# Patient Record
Sex: FEMALE | Race: WHITE | NOT HISPANIC OR LATINO | Employment: OTHER | URBAN - METROPOLITAN AREA
[De-identification: names, ages, dates, MRNs, and addresses within clinical notes are randomized per-mention and may not be internally consistent; named-entity substitution may affect disease eponyms.]

---

## 2020-10-06 ENCOUNTER — INITIAL CONSULT (OUTPATIENT)
Dept: CARDIOLOGY | Facility: CLINIC | Age: 80
End: 2020-10-06
Payer: MEDICARE

## 2020-10-06 VITALS
DIASTOLIC BLOOD PRESSURE: 77 MMHG | SYSTOLIC BLOOD PRESSURE: 131 MMHG | WEIGHT: 194.25 LBS | HEART RATE: 87 BPM | BODY MASS INDEX: 32.36 KG/M2 | HEIGHT: 65 IN | OXYGEN SATURATION: 95 %

## 2020-10-06 DIAGNOSIS — I65.23 BILATERAL CAROTID ARTERY STENOSIS: Chronic | ICD-10-CM

## 2020-10-06 DIAGNOSIS — I10 ESSENTIAL HYPERTENSION: Chronic | ICD-10-CM

## 2020-10-06 DIAGNOSIS — I34.0 SEVERE MITRAL REGURGITATION: Chronic | ICD-10-CM

## 2020-10-06 PROCEDURE — 99999 PR PBB SHADOW E&M-NEW PATIENT-LVL IV: ICD-10-PCS | Mod: PBBFAC,,, | Performed by: INTERNAL MEDICINE

## 2020-10-06 PROCEDURE — 99204 OFFICE O/P NEW MOD 45 MIN: CPT | Mod: PBBFAC | Performed by: INTERNAL MEDICINE

## 2020-10-06 PROCEDURE — 99205 OFFICE O/P NEW HI 60 MIN: CPT | Mod: S$PBB,,, | Performed by: INTERNAL MEDICINE

## 2020-10-06 PROCEDURE — 99205 PR OFFICE/OUTPT VISIT, NEW, LEVL V, 60-74 MIN: ICD-10-PCS | Mod: S$PBB,,, | Performed by: INTERNAL MEDICINE

## 2020-10-06 PROCEDURE — 99999 PR PBB SHADOW E&M-NEW PATIENT-LVL IV: CPT | Mod: PBBFAC,,, | Performed by: INTERNAL MEDICINE

## 2020-10-06 RX ORDER — ASPIRIN 81 MG/1
81 TABLET ORAL DAILY
COMMUNITY

## 2020-10-06 RX ORDER — HYDRALAZINE HYDROCHLORIDE 25 MG/1
25 TABLET, FILM COATED ORAL DAILY
COMMUNITY
End: 2020-10-06

## 2020-10-06 RX ORDER — LOSARTAN POTASSIUM 25 MG/1
25 TABLET ORAL DAILY
COMMUNITY

## 2020-10-06 RX ORDER — CYCLOBENZAPRINE HCL 10 MG
10 TABLET ORAL 3 TIMES DAILY PRN
COMMUNITY

## 2020-10-06 RX ORDER — OMEPRAZOLE 20 MG/1
20 CAPSULE, DELAYED RELEASE ORAL DAILY
COMMUNITY

## 2020-10-06 RX ORDER — CARVEDILOL 6.25 MG/1
6.25 TABLET ORAL
COMMUNITY

## 2020-10-06 RX ORDER — HYDROCHLOROTHIAZIDE 25 MG/1
12.5 TABLET ORAL DAILY
COMMUNITY

## 2020-10-06 RX ORDER — AMLODIPINE BESYLATE 5 MG/1
5 TABLET ORAL DAILY
COMMUNITY

## 2020-10-06 NOTE — PROGRESS NOTES
Patient MRN: [id number]  Patient : 1940  PCP: Primary Doctor No  Ref: Dr. Joaquin Conti    CC:   Chief Complaint   Patient presents with    Mitral Regurgitation     SOB        History of Present Illness:   Jeanmerry Peabody is a 80 y.o. y.o. female who presents for consultation for mitral regurgitation.     This is a new patient for me presenting with an established problem of mitral regurgitation. Her other chronic medical conditions include HTN, mild asymptomatic carotid artery disease.     Ms. Peabody is followed by Joaquin Conti with [facility].  She notes approximately starting in  she had acute onset dyspnea that was worse with ambulation.  She notes is getting progressively worse.  She lives currently in a motor home with her dog and his noticed that she has increasing short of breath that is now worse with less than or equal to 1 block of ambulation. This improved with rest.  She denies chest pain, lower extremity edema, any hospitalizations.  For workup for her dyspnea she had an echocardiogram that revealed severe MR. She underwent cardiac catheterization and 2020 that showed no evidence of coronary artery disease. She underwent MAGNO though significant for severe MR with flail posterior P2 segment.  She is referred [facility] for evaluation for MitraClip.    This is the extent of the patient's complaints at this time. Patient queried and denies any further complaint.     TTE 2020  Left ventricular function is normal.  Ejection fraction 60%  Left atrial diameter severely increased.  Right atrium is moderately enlarged.  Mild mitral annular calcification.  Moderate tricuspid regurgitation  Pulmonary artery systolic pressure is 51 mm Hg    MAGNO 2020  Normal LV systolic function.  Normal RV systolic function.  Left atrium appears dilated  Aortic valve is trileaflet and appears normal without evidence of stenosis.  Mitral valve posterior leaflet appears flail with severe  regurgitation noted.  Tricuspid valve appears normal no evidence stenosis.  There is trace tricuspid regurgitation.  Grade 2 atherosclerotic disease in thoracic aorta.  No intracardiac mass/thrombus in the left atrial appendage.        Past Medical History:     Past Medical History:   Diagnosis Date    Bilateral carotid artery stenosis 10/6/2020    Essential hypertension 10/6/2020    Severe mitral regurgitation 10/6/2020       Past Surgical History:     Past Surgical History:   Procedure Laterality Date    ANGIOGRAM, CORONARY, WITH LEFT HEART CATHETERIZATION  09/02/2020    APPENDECTOMY      TONSILLECTOMY         Social History:     Social History     Tobacco Use    Smoking status: Never Smoker    Smokeless tobacco: Never Used   Substance Use Topics    Alcohol use: Never     Frequency: Never       Family History:     Family History   Problem Relation Age of Onset    COPD Mother     Brain cancer Brother        Allergies:   Review of patient's allergies indicates:  No Known Allergies    Review of Systems:   Review of Systems   Constitutional: Negative for chills, fever and weight loss.   HENT: Negative for hearing loss and sore throat.    Eyes: Negative for blurred vision.   Respiratory: Positive for shortness of breath. Negative for cough.    Cardiovascular: Positive for orthopnea. Negative for chest pain, palpitations, leg swelling and PND.   Gastrointestinal: Negative for abdominal pain, constipation, diarrhea, heartburn, nausea and vomiting.   Genitourinary: Negative for dysuria.   Musculoskeletal: Negative for neck pain.   Skin: Negative for rash.   Neurological: Negative for dizziness and headaches.   Endo/Heme/Allergies: Does not bruise/bleed easily.   Psychiatric/Behavioral: Negative for substance abuse.       All other systems reviewed and are negative.   Medications:     Current Outpatient Medications on File Prior to Visit   Medication Sig Dispense Refill    amLODIPine (NORVASC) 5 MG tablet Take  "5 mg by mouth once daily.      aspirin (ECOTRIN) 81 MG EC tablet Take 81 mg by mouth once daily.      carvediloL (COREG) 6.25 MG tablet Take 6.25 mg by mouth.      cyclobenzaprine (FLEXERIL) 10 MG tablet Take 10 mg by mouth 3 (three) times daily as needed for Muscle spasms.      hydroCHLOROthiazide (HYDRODIURIL) 25 MG tablet Take 12.5 mg by mouth once daily.       losartan (COZAAR) 25 MG tablet Take 25 mg by mouth once daily.      omeprazole (PRILOSEC) 20 MG capsule Take 20 mg by mouth once daily.      [DISCONTINUED] hydrALAZINE (APRESOLINE) 25 MG tablet Take 25 mg by mouth once daily.       No current facility-administered medications on file prior to visit.        Physical Exam:   /77 (BP Location: Right arm, Patient Position: Sitting, BP Method: Large (Automatic))   Pulse 87   Ht 5' 5" (1.651 m)   Wt 88.1 kg (194 lb 3.6 oz)   SpO2 95%   BMI 32.32 kg/m²   Physical Exam   Constitutional: She is oriented to person, place, and time and well-developed, well-nourished, and in no distress.   HENT:   Head: Normocephalic and atraumatic.   Eyes: Conjunctivae are normal.   Neck: Normal range of motion. Neck supple. No JVD present.   Cardiovascular: Normal rate and regular rhythm. Exam reveals no gallop and no friction rub.   Murmur heard.   Systolic murmur is present with a grade of 3/6.  Pulmonary/Chest: Effort normal and breath sounds normal. No respiratory distress. She has no wheezes. She has no rales. She exhibits no tenderness.   Abdominal: Soft. Bowel sounds are normal. She exhibits no distension and no mass. There is no abdominal tenderness. There is no rebound and no guarding.   Musculoskeletal: Normal range of motion.         General: No edema.   Neurological: She is alert and oriented to person, place, and time.   Skin: Skin is warm and dry.   Psychiatric: Mood normal.   Vitals reviewed.      Labs:   No results found for: WBC, HGB, HCT, PLT, GRAN     Chemistry    No results found for: NA, K, CL, " CO2, BUN, CREATININE, GLU No results found for: CALCIUM, ALKPHOS, AST, ALT, BILITOT, ESTGFRAFRICA, EGFRNONAA       No results found for: ALT, AST, GGT, ALKPHOS, BILITOT   No results found for: HGBA1C  No results found for: BNP, BNPTRIAGEBLO  No results found for: CHOL, HDL, LDLCALC, TRIG  No results found for: INR, PROTIME     I have reviewed all pertinent labs within the past 24 hours.    Cardiovascular Imaging:   Echo: No results found for: EF  2D echo with color flow doppler: No results found for this or any previous visit. and Transthoracic echo (TTE) complete (Cupid Only): No results found for this or any previous visit.      Test(s) Reviewed  I have reviewed the following in detail:  [] Stress test   [x] Angiography   [x] Echocardiogram   [] Labs   [] Other:       Assessment & Plan:   Severe mitral regurgitation  - Degenerative mitral regurgitation posterior leaflet flail P2 segment  - dyspnea with ambulation less than 1 block of ambulation  - LHC- negative for ischemia   - Reviewed outside MAGNO images.   - continue current therapy with Norvasc 5, carvedilol 6.25, losartan 25, HCTZ 12.5  - NYHA class 3     - FEV1/DLCO-pending  -STS- 3.6%  -KCCQ/6 min walk test-pending  -TTE pending  MV  EOA  MV area  MV gradient  Insufficiency degree     CTS evaluation-pending    Essential hypertension  - continue current therapy with Norvasc 5, carvedilol 6.25, losartan 25, HCTZ 12.5    Bilateral carotid artery stenosis  Continue aspirin 81 mg.  Currently not on a statin  Asymptomatic without history of TIA/CVA       Marbin Reardon - Pager# [phone]  10/6/2020  5:16 PM  Cardiovascular Fellow  [facility]         Interventional Cardiology Staff  I have personally taken the history and examined this patient. I have discussed and agree with the resident's findings and plan as documented in the resident's note.  Patient referred by Dr. Conti to evaluate for mitral regurgitation and mitral clip.  Transesophageal  echo demonstrates that she has a flail P2 leaflet and is a good candidate for mitral clip.  She still needs to complete screening details including surgical consultation, walk test, pulmonary function and Brookesmith questionnaire.    Alexandr Everett

## 2020-10-06 NOTE — LETTER
October 7, 2020      Joaquin Conti MD  [address]  Cardiovascular [place] [facility] [place] [address]           [facility] 3rd Fl  [address]  [facility] [address]  Phone: [phone]          Patient: Jeanmerry Peabody   MR Number: [id number]   YOB: 1940   Date of Visit: 10/6/2020       Dear Dr. Joaquin Conti:    Thank you for referring Jeanmerry Peabody to me for evaluation. Attached you will find relevant portions of my assessment and plan of care.    If you have questions, please do not hesitate to call me. I look forward to following Jeanmerry Peabody along with you.    Sincerely,    Alexandr Everett MD    Enclosure  CC:  No Recipients    If you would like to receive this communication electronically, please contact [email or web address] or [phone] to request more information on [employer] access.    For providers and/or their staff who would like to refer a patient to [facility], please contact us through our one-stop-shop provider referral line, [facility], at [phone].    If you feel you have received this communication in error or would no longer like to receive these types of communications, please e-mail [email or web address]

## 2020-10-08 DIAGNOSIS — Z01.812 PRE-PROCEDURE LAB EXAM: Primary | ICD-10-CM

## 2020-10-08 DIAGNOSIS — I34.0 SEVERE MITRAL REGURGITATION: Primary | ICD-10-CM

## 2020-10-13 ENCOUNTER — LAB VISIT (OUTPATIENT)
Dept: FAMILY MEDICINE | Facility: CLINIC | Age: 80
End: 2020-10-13
Payer: MEDICARE

## 2020-10-13 DIAGNOSIS — Z01.812 PRE-PROCEDURE LAB EXAM: ICD-10-CM

## 2020-10-13 PROCEDURE — U0003 INFECTIOUS AGENT DETECTION BY NUCLEIC ACID (DNA OR RNA); SEVERE ACUTE RESPIRATORY SYNDROME CORONAVIRUS 2 (SARS-COV-2) (CORONAVIRUS DISEASE [COVID-19]), AMPLIFIED PROBE TECHNIQUE, MAKING USE OF HIGH THROUGHPUT TECHNOLOGIES AS DESCRIBED BY CMS-2020-01-R: HCPCS

## 2020-10-14 LAB — SARS-COV-2 RNA RESP QL NAA+PROBE: NOT DETECTED

## 2020-10-15 ENCOUNTER — HOSPITAL ENCOUNTER (OUTPATIENT)
Dept: PULMONOLOGY | Facility: CLINIC | Age: 80
Discharge: HOME | End: 2020-10-15
Payer: MEDICARE

## 2020-10-15 ENCOUNTER — OFFICE VISIT (OUTPATIENT)
Dept: CARDIOTHORACIC SURGERY | Facility: CLINIC | Age: 80
End: 2020-10-15
Payer: MEDICARE

## 2020-10-15 ENCOUNTER — HOSPITAL ENCOUNTER (OUTPATIENT)
Dept: CARDIOLOGY | Facility: HOSPITAL | Age: 80
Discharge: HOME | End: 2020-10-15
Attending: INTERNAL MEDICINE
Payer: MEDICARE

## 2020-10-15 VITALS
HEART RATE: 83 BPM | HEIGHT: 65 IN | OXYGEN SATURATION: 98 % | BODY MASS INDEX: 32.4 KG/M2 | WEIGHT: 194.44 LBS | BODY MASS INDEX: 32.4 KG/M2 | WEIGHT: 194.44 LBS | HEIGHT: 65 IN | DIASTOLIC BLOOD PRESSURE: 79 MMHG | SYSTOLIC BLOOD PRESSURE: 145 MMHG

## 2020-10-15 VITALS
SYSTOLIC BLOOD PRESSURE: 138 MMHG | DIASTOLIC BLOOD PRESSURE: 70 MMHG | BODY MASS INDEX: 32.32 KG/M2 | WEIGHT: 194 LBS | HEIGHT: 65 IN | HEART RATE: 80 BPM

## 2020-10-15 DIAGNOSIS — I34.0 SEVERE MITRAL REGURGITATION: Chronic | ICD-10-CM

## 2020-10-15 DIAGNOSIS — I34.0 SEVERE MITRAL REGURGITATION: ICD-10-CM

## 2020-10-15 LAB
ASCENDING AORTA: 3.9 CM
AV INDEX (PROSTH): 0.68
AV MEAN GRADIENT: 4 MMHG
AV PEAK GRADIENT: 10 MMHG
AV VALVE AREA: 2.91 CM2
AV VELOCITY RATIO: 0.58
BSA FOR ECHO PROCEDURE: 2.01 M2
CV ECHO LV RWT: 0.32 CM
DOP CALC AO PEAK VEL: 1.61 M/S
DOP CALC AO VTI: 25.33 CM
DOP CALC LVOT AREA: 4.3 CM2
DOP CALC LVOT DIAMETER: 2.33 CM
DOP CALC LVOT PEAK VEL: 0.94 M/S
DOP CALC LVOT STROKE VOLUME: 73.6 CM3
DOP CALCLVOT PEAK VEL VTI: 17.27 CM
E WAVE DECELERATION TIME: 120.96 MSEC
E/A RATIO: 1.43
E/E' RATIO: 10.53 M/S
ECHO LV POSTERIOR WALL: 0.9 CM (ref 0.6–1.1)
FRACTIONAL SHORTENING: 21 % (ref 28–44)
INTERVENTRICULAR SEPTUM: 0.9 CM (ref 0.6–1.1)
IVRT: 54.23 MSEC
LA MAJOR: 6.82 CM
LA MINOR: 7.2 CM
LA WIDTH: 6.54 CM
LEFT ATRIUM SIZE: 4.86 CM
LEFT ATRIUM VOLUME INDEX MOD: 86.5 ML/M2
LEFT ATRIUM VOLUME INDEX: 96.9 ML/M2
LEFT ATRIUM VOLUME MOD: 169 CM3
LEFT ATRIUM VOLUME: 189.25 CM3
LEFT INTERNAL DIMENSION IN SYSTOLE: 4.5 CM (ref 2.1–4)
LEFT VENTRICLE DIASTOLIC VOLUME INDEX: 69.34 ML/M2
LEFT VENTRICLE DIASTOLIC VOLUME: 135.4 ML
LEFT VENTRICLE MASS INDEX: 101 G/M2
LEFT VENTRICLE SYSTOLIC VOLUME INDEX: 30.7 ML/M2
LEFT VENTRICLE SYSTOLIC VOLUME: 59.96 ML
LEFT VENTRICULAR INTERNAL DIMENSION IN DIASTOLE: 5.7 CM (ref 3.5–6)
LEFT VENTRICULAR MASS: 197.52 G
LV LATERAL E/E' RATIO: 8.33 M/S
LV SEPTAL E/E' RATIO: 14.29 M/S
MV PEAK A VEL: 0.7 M/S
MV PEAK E VEL: 1 M/S
MV STENOSIS PRESSURE HALF TIME: 35.08 MS
MV VALVE AREA P 1/2 METHOD: 6.27 CM2
PISA MRMAX VEL: 0.06 M/S
PISA TR MAX VEL: 4.02 M/S
PULM VEIN S/D RATIO: 2.27
PV PEAK D VEL: 0.41 M/S
PV PEAK S VEL: 0.93 M/S
RA MAJOR: 4.62 CM
RA PRESSURE: 8 MMHG
RA WIDTH: 4.7 CM
RIGHT VENTRICULAR END-DIASTOLIC DIMENSION: 3.84 CM
RV TISSUE DOPPLER FREE WALL SYSTOLIC VELOCITY 1 (APICAL 4 CHAMBER VIEW): 10.76 CM/S
SINUS: 3.7 CM
STJ: 3.6 CM
TDI LATERAL: 0.12 M/S
TDI SEPTAL: 0.07 M/S
TDI: 0.1 M/S
TR MAX PG: 65 MMHG
TRICUSPID ANNULAR PLANE SYSTOLIC EXCURSION: 2.77 CM
TV REST PULMONARY ARTERY PRESSURE: 73 MMHG

## 2020-10-15 PROCEDURE — 94010 BREATHING CAPACITY TEST: CPT | Mod: 26,S$PBB,, | Performed by: INTERNAL MEDICINE

## 2020-10-15 PROCEDURE — 94727 PR PULM FUNCTION TEST BY GAS: ICD-10-PCS | Mod: 26,S$PBB,, | Performed by: INTERNAL MEDICINE

## 2020-10-15 PROCEDURE — 99205 PR OFFICE/OUTPT VISIT, NEW, LEVL V, 60-74 MIN: ICD-10-PCS | Mod: S$PBB,,, | Performed by: THORACIC SURGERY (CARDIOTHORACIC VASCULAR SURGERY)

## 2020-10-15 PROCEDURE — 94618 PULMONARY STRESS TESTING: CPT | Mod: 26,S$PBB,, | Performed by: INTERNAL MEDICINE

## 2020-10-15 PROCEDURE — 94727 GAS DIL/WSHOT DETER LNG VOL: CPT | Mod: 26,S$PBB,, | Performed by: INTERNAL MEDICINE

## 2020-10-15 PROCEDURE — 99999 PR PBB SHADOW E&M-EST. PATIENT-LVL III: ICD-10-PCS | Mod: PBBFAC,,, | Performed by: THORACIC SURGERY (CARDIOTHORACIC VASCULAR SURGERY)

## 2020-10-15 PROCEDURE — 94729 PR C02/MEMBANE DIFFUSE CAPACITY: ICD-10-PCS | Mod: 26,S$PBB,, | Performed by: INTERNAL MEDICINE

## 2020-10-15 PROCEDURE — 99205 OFFICE O/P NEW HI 60 MIN: CPT | Mod: S$PBB,,, | Performed by: THORACIC SURGERY (CARDIOTHORACIC VASCULAR SURGERY)

## 2020-10-15 PROCEDURE — 93306 ECHO (CUPID ONLY): ICD-10-PCS | Mod: 26,,, | Performed by: INTERNAL MEDICINE

## 2020-10-15 PROCEDURE — 94010 BREATHING CAPACITY TEST: ICD-10-PCS | Mod: 26,S$PBB,, | Performed by: INTERNAL MEDICINE

## 2020-10-15 PROCEDURE — 94727 GAS DIL/WSHOT DETER LNG VOL: CPT | Mod: PBBFAC | Performed by: INTERNAL MEDICINE

## 2020-10-15 PROCEDURE — 93306 TTE W/DOPPLER COMPLETE: CPT

## 2020-10-15 PROCEDURE — 94010 BREATHING CAPACITY TEST: CPT | Mod: PBBFAC | Performed by: INTERNAL MEDICINE

## 2020-10-15 PROCEDURE — 99999 PR PBB SHADOW E&M-EST. PATIENT-LVL III: CPT | Mod: PBBFAC,,, | Performed by: THORACIC SURGERY (CARDIOTHORACIC VASCULAR SURGERY)

## 2020-10-15 PROCEDURE — 94729 DIFFUSING CAPACITY: CPT | Mod: PBBFAC | Performed by: INTERNAL MEDICINE

## 2020-10-15 PROCEDURE — 93306 TTE W/DOPPLER COMPLETE: CPT | Mod: 26,,, | Performed by: INTERNAL MEDICINE

## 2020-10-15 PROCEDURE — 99213 OFFICE O/P EST LOW 20 MIN: CPT | Mod: PBBFAC,25 | Performed by: THORACIC SURGERY (CARDIOTHORACIC VASCULAR SURGERY)

## 2020-10-15 PROCEDURE — 94729 DIFFUSING CAPACITY: CPT | Mod: 26,S$PBB,, | Performed by: INTERNAL MEDICINE

## 2020-10-15 PROCEDURE — 94618 PULMONARY STRESS TESTING: CPT | Mod: PBBFAC | Performed by: INTERNAL MEDICINE

## 2020-10-15 PROCEDURE — 94618 PULMONARY STRESS TESTING: ICD-10-PCS | Mod: 26,S$PBB,, | Performed by: INTERNAL MEDICINE

## 2020-10-15 RX ORDER — NITROGLYCERIN 0.4 MG/1
TABLET SUBLINGUAL
COMMUNITY
Start: 2020-07-31

## 2020-10-15 NOTE — PROGRESS NOTES
Subjective:      Patient ID: Jeanmerry Peabody is a 80 y.o. female.    Chief Complaint: No chief complaint on file.      HPI:  Jeanmerry Peabody is a 80 y.o. female with a medical history significant carotid artery stenosis and hypertension who presents for surgical evaluation for  Mitral valve regurgitation and possible mitral clip with Dr. Everett.  She is normally is followed by Joaquin Conti with [facility].  She notes approximately starting in January/February she had acute onset dyspnea that was worse with ambulation.  She reports that while she was being worked up for her shortness of breath, she was found to have mitral regurgitation.  Patients most recent ECHO from today shows suspicion of a posterior leaflet prolapse/flail; however, the posterior leaflet is not well visualized. There is severe eccentric, anteriorly directed mitral regurgitation. Her pulmonary pressure is 73.  She reports not being able to ambulate around her home with shortness of breath and must use some form of assistive device for ambulation.        Family and social history reviewed    Review of patient's allergies indicates:  No Known Allergies  Past Medical History:   Diagnosis Date    Bilateral carotid artery stenosis 10/6/2020    Essential hypertension 10/6/2020    Severe mitral regurgitation 10/6/2020     Past Surgical History:   Procedure Laterality Date    ANGIOGRAM, CORONARY, WITH LEFT HEART CATHETERIZATION  09/02/2020    APPENDECTOMY      TONSILLECTOMY       Family History     Problem Relation (Age of Onset)    Brain cancer Brother    COPD Mother        Social History     Socioeconomic History    Marital status: Single     Spouse name: Not on file    Number of children: Not on file    Years of education: Not on file    Highest education level: Not on file   Occupational History    Not on file   Social Needs    Financial resource strain: Not on file    Food insecurity     Worry: Not on file     Inability: Not on file     Transportation needs     Medical: Not on file     Non-medical: Not on file   Tobacco Use    Smoking status: Never Smoker    Smokeless tobacco: Never Used   Substance and Sexual Activity    Alcohol use: Never     Frequency: Never    Drug use: Never    Sexual activity: Not on file   Lifestyle    Physical activity     Days per week: Not on file     Minutes per session: Not on file    Stress: Not on file   Relationships    Social connections     Talks on phone: Not on file     Gets together: Not on file     Attends Evangelical service: Not on file     Active member of club or organization: Not on file     Attends meetings of clubs or organizations: Not on file     Relationship status: Not on file   Other Topics Concern    Not on file   Social History Narrative    Hospital Administration        Current Outpatient Medications:     amLODIPine (NORVASC) 5 MG tablet, Take 5 mg by mouth once daily., Disp: , Rfl:     aspirin (ECOTRIN) 81 MG EC tablet, Take 81 mg by mouth once daily., Disp: , Rfl:     carvediloL (COREG) 6.25 MG tablet, Take 6.25 mg by mouth., Disp: , Rfl:     cyclobenzaprine (FLEXERIL) 10 MG tablet, Take 10 mg by mouth 3 (three) times daily as needed for Muscle spasms., Disp: , Rfl:     hydroCHLOROthiazide (HYDRODIURIL) 25 MG tablet, Take 12.5 mg by mouth once daily. , Disp: , Rfl:     losartan (COZAAR) 25 MG tablet, Take 25 mg by mouth once daily., Disp: , Rfl:     omeprazole (PRILOSEC) 20 MG capsule, Take 20 mg by mouth once daily., Disp: , Rfl:   Current medications Reviewed    Review of Systems   Constitutional: Negative for activity change, appetite change, fatigue and fever.   HENT: Negative for nosebleeds.    Respiratory: Positive for shortness of breath. Negative for cough.    Cardiovascular: Negative for chest pain, palpitations and leg swelling.   Gastrointestinal: Negative for abdominal distention, abdominal pain and nausea.   Genitourinary: Negative for frequency.   Musculoskeletal:  Negative for arthralgias and myalgias.   Skin: Negative for rash.   Neurological: Negative for dizziness and numbness.   Hematological: Does not bruise/bleed easily.     Objective:   Physical Exam  Constitutional:       Appearance: She is well-developed.   HENT:      Head: Normocephalic and atraumatic.   Eyes:      Extraocular Movements: Extraocular movements intact.   Cardiovascular:      Rate and Rhythm: Normal rate and regular rhythm.      Heart sounds: Murmur present.   Pulmonary:      Effort: Pulmonary effort is normal.      Breath sounds: Normal breath sounds.   Abdominal:      Palpations: Abdomen is soft.   Musculoskeletal: Normal range of motion.   Skin:     General: Skin is warm and dry.   Neurological:      Mental Status: She is alert and oriented to person, place, and time.       Diagnostic Results:   FEV1 65%  TTE 10/15/2020   · The left ventricle is mildly enlarged with normal systolic function. The estimated ejection fraction is 60%.  · There is left ventricular eccentric hypertrophy.  · Indeterminate diastolic function.  · There is suspicion of a posterior leaflet prolapse/flail; however, the posterior leaflet is not well visualized. There is severe eccentric, anteriorly directed mitral regurgitation.  · Normal right ventricular systolic function.  · Mild tricuspid regurgitation.  · The estimated PA systolic pressure is 73 mmHg.  · Intermediate central venous pressure (8 mmHg).  · There is pulmonary hypertension.  · Severe left atrial enlargement.    MAGNO 09/02/2020  Normal LV systolic function.  Normal RV systolic function.  Left atrium appears dilated  Aortic valve is trileaflet and appears normal without evidence of stenosis.  Mitral valve posterior leaflet appears flail with severe regurgitation noted.  Tricuspid valve appears normal no evidence stenosis.  There is trace tricuspid regurgitation.  Grade 2 atherosclerotic disease in thoracic aorta.  No intracardiac mass/thrombus in the left atrial  appendage.    TTE 7/31/2020  Left ventricular function is normal.  Ejection fraction 60%  Left atrial diameter severely increased.  Right atrium is moderately enlarged.  Mild mitral annular calcification.  Moderate tricuspid regurgitation  Pulmonary artery systolic pressure is 51 mm Hg     Assessment:   1. Mitral Valve regurgitation  Plan:     CTS Attending Note:    I have personally taken the history and examined this patient and agree with the FELIPE's note as stated above.  Very pleasant 80-year-old woman with lifestyle limiting dyspnea on exertion.  She underwent a thorough evaluation which demonstrated severe mitral regurgitation as well as severe pulmonary hypertension.  Her mobility is somewhat limited by orthopedic issues, and she uses a walker for ambulation.  She is adamantly opposed to open operation.  Given her obesity and functional status, I believe she would be at high risk for valvular heart surgery.  I agree with planned mitral clip.

## 2020-10-15 NOTE — LETTER
October 15, 2020      Alexandr Everett MD  [address]  [facility] [address]           [facility] 2nd Fl  [address]  [facility] [address]  Phone: [phone]          Patient: Jeanmerry Peabody   MR Number: [id number]   YOB: 1940   Date of Visit: 10/15/2020       Dear Dr. Alexandr Everett:    Thank you for referring Jeanmerry Peabody to me for evaluation. Attached you will find relevant portions of my assessment and plan of care.    If you have questions, please do not hesitate to call me. I look forward to following Jeanmerry Peabody along with you.    Sincerely,    Marques Malcolm MD    Enclosure  CC:  No Recipients    If you would like to receive this communication electronically, please contact [email or web address] or [phone] to request more information on [employer] Link access.    For providers and/or their staff who would like to refer a patient to [facility], please contact us through our one-stop-shop provider referral line, [facility], at [phone].    If you feel you have received this communication in error or would no longer like to receive these types of communications, please e-mail [email or web address]

## 2020-10-16 NOTE — PROCEDURES
Jeanmerry Peabody is a 80 y.o.  female patient, who presents for a 6 minute walk test ordered by MD Karlos.  The diagnosis is Mitral Valve Disorder.  The patient's BMI is 32.4 kg/m2.  Predicted distance (lower limit of normal) is 215.25 meters.      Test Results:    The test was not completed.  The patient stopped 2 times for a total of 191 seconds.  The total time walked was 169 seconds.  During walking, the patient reported:  Dyspnea, Other (Comment)(knee pain, hip pain).  The patient used no assistive devices during testing.     10/15/2020---------Distance: 121.92 meters (400 feet)     O2 Sat % Supplemental Oxygen Heart Rate Blood Pressure Federico Scale   Pre-exercise  (Resting) 97 % Room Air 77 bpm 124/81 mmHg 0   During Exercise 89 % Room Air 101 bpm 183/117 mmHg 5-6   Post-exercise  (Recovery) 97 % Room Air  88 bpm 158/80 mmHg      Recovery Time: 321 seconds    Performing nurse/tech: Chayo HI      PREVIOUS STUDY:   The patient has not had a previous study.      CLINICAL INTERPRETATION:  Six minute walk distance is 121.92 meters (400 feet) with heavy dyspnea.  During exercise, there was significant desaturation while breathing room air.  Both blood pressure and heart rate increased significantly with walking.  This may represent a hypertensive response to exercise.  The patient reported non-pulmonary symptoms during exercise.  Severe exercise impairment is likely due to desaturation, cardiovascular causes and subjective symptoms.  The patient did not complete the study, walking 169 seconds of the 360 second test.  No previous study performed.  Based upon age and body mass index, exercise capacity is less than predicted.

## 2020-10-21 DIAGNOSIS — I34.0 MITRAL VALVE INSUFFICIENCY, UNSPECIFIED ETIOLOGY: Primary | ICD-10-CM

## 2020-10-21 DIAGNOSIS — I34.0 MITRAL REGURGITATION: ICD-10-CM

## 2020-10-21 DIAGNOSIS — I34.0 SEVERE MITRAL REGURGITATION: Primary | ICD-10-CM

## 2020-10-21 DIAGNOSIS — N18.9 CHRONIC KIDNEY DISEASE, UNSPECIFIED CKD STAGE: ICD-10-CM

## 2020-10-21 RX ORDER — SODIUM CHLORIDE 9 MG/ML
3 INJECTION, SOLUTION INTRAVENOUS CONTINUOUS
Status: CANCELLED | OUTPATIENT
Start: 2020-10-21 | End: 2020-10-21

## 2020-10-21 RX ORDER — DIPHENHYDRAMINE HCL 25 MG
50 CAPSULE ORAL ONCE
Status: CANCELLED | OUTPATIENT
Start: 2020-10-21 | End: 2020-10-21

## 2020-10-22 ENCOUNTER — TELEPHONE (OUTPATIENT)
Dept: CARDIOLOGY | Facility: CLINIC | Age: 80
End: 2020-10-22

## 2020-10-22 DIAGNOSIS — I34.0 MITRAL VALVE INSUFFICIENCY, UNSPECIFIED ETIOLOGY: Primary | ICD-10-CM

## 2020-10-22 NOTE — TELEPHONE ENCOUNTER
You have been scheduled for your procedure on Friday, November 6, 2020.  Please report to the Cardiology Waiting Area on the Third floor of the hospital and check in at 6 AM. You will then be taken to the SSCU (Short Stay Cardiac Unit) and prepared for your procedure. Please be aware that this is not the time of your procedure but the time that you are to arrive.     Preperations for your procedure  1. Shower with Dial soap the night before and the morning of your procedure.  2. Call the office for any signs of infection ( fever, cough, pneumonia, urinary tract, etc.) We cannot implant a device in an infected patient.      You may not have anything to eat or drink after midnight the night before your test. You may take your regular morning medications with as much water as necessary. If there are any medications that you should not take you will be instructed to hold them that morning. If you are diabetic and on Metformin (Glucophage) do not take it the day before, the day of, and for 2 days after your procedure. If you are on Pradaxa, Xarelto, Eliquis, or Coumadin hold 3 days prior to your procedure.     The entire procedure may take up to three hours. After the procedure, you will return to your room on the third floor where you will be monitored closely for the next several hours.     Your length of stay in the hospital will be determined by your physician. You may be discharged the same day if your physician is satisfied with your progress.     Follow up After Your Procedure  It is required that you return to [facility] for follow up in 1month and in 1 year with an echo, lab work, and a visit with your physician. You can follow up with your regular Cardiologist regarding any other heart issues.     If you should have any questions, concerns, or need to change the date of your procedure, please call  SINTIA Yoo @ [phone]    Special Instructions:  none    THE ABOVE INSTRUCTIONS WERE GIVEN TO THE PATIENT  VERBALLY AND THEY VERBALIZED UNDERSTANDING.  THEY DO NOT REQUIRE ANY SPECIAL NEEDS AND DO NOT HAVE ANY LEARNING BARRIERS.          Directions for Reporting to Cardiology Waiting Area in the Hospital  If you park in the Parking Garage:  Take elevators to the1st floor of the parking garage.  Continue past the gift shop, coffee shop, and piano.  Take a right and go to the gold elevators. (Elevator B)  Take the elevator to the 3rd floor.  Follow the arrow on the sign on the wall that says Cath Lab Registration/EP Lab Registration.  Follow the long hallway all the way around until you come to a big open area.  This is the registration area.  Check in at Reception Desk.    OR    If family is dropping you off:  Have them drop you off at the front of the Hospital under the green overhang.  Enter through the doors and take a right.  Take the E elevators to the 3rd floor Cardiology Waiting Area.  Check in at the Reception Desk in the waiting room.

## 2020-11-05 ENCOUNTER — ANESTHESIA EVENT (OUTPATIENT)
Dept: MEDSURG UNIT | Facility: HOSPITAL | Age: 80
DRG: 267 | End: 2020-11-05
Payer: MEDICARE

## 2020-11-05 ENCOUNTER — OFFICE VISIT (OUTPATIENT)
Dept: CARDIOLOGY | Facility: CLINIC | Age: 80
DRG: 267 | End: 2020-11-05
Payer: MEDICARE

## 2020-11-05 ENCOUNTER — LAB VISIT (OUTPATIENT)
Dept: SURGERY | Facility: CLINIC | Age: 80
DRG: 267 | End: 2020-11-05
Payer: MEDICARE

## 2020-11-05 VITALS
SYSTOLIC BLOOD PRESSURE: 146 MMHG | BODY MASS INDEX: 32.4 KG/M2 | HEART RATE: 81 BPM | OXYGEN SATURATION: 95 % | HEIGHT: 65 IN | DIASTOLIC BLOOD PRESSURE: 81 MMHG | WEIGHT: 194.44 LBS

## 2020-11-05 DIAGNOSIS — I34.0 MITRAL REGURGITATION: ICD-10-CM

## 2020-11-05 DIAGNOSIS — I65.23 BILATERAL CAROTID ARTERY STENOSIS: Chronic | ICD-10-CM

## 2020-11-05 DIAGNOSIS — I34.0 SEVERE MITRAL REGURGITATION: Chronic | ICD-10-CM

## 2020-11-05 DIAGNOSIS — I10 ESSENTIAL HYPERTENSION: Chronic | ICD-10-CM

## 2020-11-05 LAB — SARS-COV-2 RNA RESP QL NAA+PROBE: NOT DETECTED

## 2020-11-05 PROCEDURE — 99214 PR OFFICE/OUTPT VISIT, EST, LEVL IV, 30-39 MIN: ICD-10-PCS | Mod: S$PBB,,, | Performed by: INTERNAL MEDICINE

## 2020-11-05 PROCEDURE — 99213 OFFICE O/P EST LOW 20 MIN: CPT | Mod: PBBFAC

## 2020-11-05 PROCEDURE — 99214 OFFICE O/P EST MOD 30 MIN: CPT | Mod: S$PBB,,, | Performed by: INTERNAL MEDICINE

## 2020-11-05 PROCEDURE — 99999 PR PBB SHADOW E&M-EST. PATIENT-LVL III: ICD-10-PCS | Mod: PBBFAC,,,

## 2020-11-05 PROCEDURE — 99999 PR PBB SHADOW E&M-EST. PATIENT-LVL III: CPT | Mod: PBBFAC,,,

## 2020-11-05 PROCEDURE — U0003 INFECTIOUS AGENT DETECTION BY NUCLEIC ACID (DNA OR RNA); SEVERE ACUTE RESPIRATORY SYNDROME CORONAVIRUS 2 (SARS-COV-2) (CORONAVIRUS DISEASE [COVID-19]), AMPLIFIED PROBE TECHNIQUE, MAKING USE OF HIGH THROUGHPUT TECHNOLOGIES AS DESCRIBED BY CMS-2020-01-R: HCPCS

## 2020-11-05 NOTE — PROGRESS NOTES
Subjective:    Patient ID:  Jeanmerry Peabody is a 80 y.o. female who presents for follow-up of Mitral Regurgitation      Referring Physician: Dr. Joaquin Conti with [facility]    HPI    Jeanmerry Peabody is a 80 y.o. female referred by Dr Joaquin Conti for evaluation of severe MR (NYHA Class II sx). She is here today to sign consents for MitraClip. She notes approximately starting in January/February she had acute onset dyspnea that was worse with ambulation.  She notes is getting progressively worse.  She lives currently in a motor home with her dog and his noticed that she has increasing short of breath that is now worse with less than or equal to 1 block of ambulation. This improved with rest.  She denies chest pain, lower extremity edema, any hospitalizations.     Mitral Valve Disease Etiology: Degenerative Mitral Regurgitation, Leaflet Flail:  Posterior    The patient has undergone the following MitraClip work-up:    MAGNO 9/2/20: flail P2 leaflet with severe mitral regurgitation, EF 60%   TTE (Date 10/15/20): Severe mitral insufficiency, MVA 6.27 cm2, EF= 60%.   LH (Date 08/2020): normal coronaries    STS: 3.6%    Frailty: 1/4 (failed walk)   CT Surgery risk assessment: high risk, per Dr Malcolm due to functional status.    PFTs: FEV1 64.7% predicted, DLCO 59% predicted.   Comorbidities: CAD, HTN, pulmonary HTN    NYHA: II CCS: 0    Review of Systems   Constitution: Negative for chills and fever.   HENT: Negative for sore throat.    Eyes: Negative for blurred vision.   Cardiovascular: Positive for dyspnea on exertion and leg swelling. Negative for chest pain, claudication, cyanosis, irregular heartbeat, near-syncope, orthopnea, palpitations, paroxysmal nocturnal dyspnea and syncope.   Respiratory: Positive for shortness of breath. Negative for cough and sputum production.    Hematologic/Lymphatic: Does not bruise/bleed easily.   Skin: Negative for itching, rash and suspicious lesions.   Musculoskeletal: Negative  for falls.   Gastrointestinal: Negative for abdominal pain and change in bowel habit.   Genitourinary: Negative for dysuria.   Neurological: Negative for disturbances in coordination, dizziness and loss of balance.   Psychiatric/Behavioral: Negative for altered mental status.          Past Medical History:   Diagnosis Date    Bilateral carotid artery stenosis 10/6/2020    Essential hypertension 10/6/2020    Severe mitral regurgitation 10/6/2020       Current Outpatient Medications:     amLODIPine (NORVASC) 5 MG tablet, Take 5 mg by mouth once daily., Disp: , Rfl:     aspirin (ECOTRIN) 81 MG EC tablet, Take 81 mg by mouth once daily., Disp: , Rfl:     carvediloL (COREG) 6.25 MG tablet, Take 6.25 mg by mouth., Disp: , Rfl:     cyclobenzaprine (FLEXERIL) 10 MG tablet, Take 10 mg by mouth 3 (three) times daily as needed for Muscle spasms., Disp: , Rfl:     hydroCHLOROthiazide (HYDRODIURIL) 25 MG tablet, Take 12.5 mg by mouth once daily. , Disp: , Rfl:     losartan (COZAAR) 25 MG tablet, Take 25 mg by mouth once daily., Disp: , Rfl:     nitroGLYCERIN (NITROSTAT) 0.4 MG SL tablet, DIS FLORENCE . 5 MIN APART . MAX 3 TS IN 15 MIN, Disp: , Rfl:     omeprazole (PRILOSEC) 20 MG capsule, Take 20 mg by mouth once daily., Disp: , Rfl:     Objective:    Physical Exam   Constitutional: She is oriented to person, place, and time. She appears well-developed and well-nourished. No distress.   HENT:   Head: Normocephalic and atraumatic.   Eyes: EOM are normal.   Neck: Normal range of motion. No JVD present.   Cardiovascular: Normal rate, regular rhythm and intact distal pulses.   Murmur heard.  High-pitched blowing holosystolic murmur is present with a grade of 2/6 at the apex.  Pulmonary/Chest: Effort normal and breath sounds normal. No respiratory distress.   Abdominal: Soft. She exhibits no distension.   Musculoskeletal:         General: No edema.   Neurological: She is alert and oriented to person, place, and time.   Skin:  "Skin is warm and dry. She is not diaphoretic.   Vitals reviewed.          Vitals:    11/05/20 1301 11/05/20 1302   BP: (!) 140/80 (!) 146/81   BP Location: Right arm Left arm   Patient Position: Sitting Sitting   BP Method: Large (Automatic) Large (Automatic)   Pulse: 81 81   SpO2: 95%    Weight: 88.2 kg (194 lb 7.1 oz)    Height: 5' 5" (1.651 m)      Body mass index is 32.36 kg/m².    Test(s) Reviewed  I have reviewed the following in detail:  [] Stress test   [] Angiography   [] Echocardiogram   [] Labs:   [] Other:       Assessment:   Severe mitral regurgitation  Jeanmerry Peabody is a 80 y.o. female referred by Dr Joaquin Conti for evaluation of severe MR (NYHA Class II sx).     Mitral Valve Disease Etiology: Degenerative Mitral Regurgitation, Leaflet Flail:  Posterior    The patient has undergone the following MitraClip work-up:    MAGNO 9/2/20: flail P2 leaflet with severe mitral regurgitation, EF 60%   TTE (Date 10/15/20): Severe mitral insufficiency, MVA 6.27 cm2, EF= 60%.   [facility] (Date 08/2020): normal coronaries    STS: 3.6%    Frailty: 1/4 (failed walk)   CT Surgery risk assessment: high risk, per Dr Malcolm due to functional status.    PFTs: FEV1 64.7% predicted, DLCO 59% predicted.   Comorbidities: CAD, HTN, pulmonary HTN    Essential hypertension  Continue current therapy with Norvasc 5, carvedilol 6.25, losartan 25, HCTZ 12.5. Further management by Dr. Conti.     Bilateral carotid artery stenosis  Stable. Asymptomatic. On ASA. No history of TIA/CVA.    Plan:     Proceed with MitraClip as planned on Friday.   The risks, benefits & alternatives of the procedure were explained to the patient.   The risks of MitralClip include but are not limited to: Bleeding, infection, heart rhythm abnormalities, allergic reactions, kidney injury requiring dilaysis, stroke and death.   Informed consent was obtained & the patient is agreeable to proceed with the procedure.         Briseida Real PA-C  Valve and " Structural Heart Disease  [facility]

## 2020-11-05 NOTE — ANESTHESIA PREPROCEDURE EVALUATION
[facility]  Anesthesia Pre-Operative Evaluation         Patient Name: Jeanmerry Peabody  YOB: 1940  MRN: [id number]    SUBJECTIVE:     Pre-operative evaluation for Procedure(s) (LRB):  Mitral clip (N/A)     11/05/2020    Jeanmerry Peabody is a 80 y.o. female w/ a significant PMHx of carotid artery stenosis and HTN. Diagnosed with severe MR with pHTN (73 mmHg) and not a surgical candidate for MVR.    Work-up:  MAGNO 9/2/20: flail P2 leaflet with severe mitral regurgitation, EF 60%  TTE (Date 10/15/20): Severe mitral insufficiency, MVA 6.27 cm2, EF= 60%.  LHC (Date 08/2020): normal coronaries    Patient now presents for the above procedure(s).      LDA: None documented.    Prev airway: None documented.    Drips: None documented.    Patient Active Problem List   Diagnosis    Severe mitral regurgitation    Essential hypertension    Bilateral carotid artery stenosis       Review of patient's allergies indicates:  No Known Allergies    Current Outpatient Medications:  No current facility-administered medications for this encounter.     Current Outpatient Medications:     amLODIPine (NORVASC) 5 MG tablet, Take 5 mg by mouth once daily., Disp: , Rfl:     aspirin (ECOTRIN) 81 MG EC tablet, Take 81 mg by mouth once daily., Disp: , Rfl:     carvediloL (COREG) 6.25 MG tablet, Take 6.25 mg by mouth., Disp: , Rfl:     cyclobenzaprine (FLEXERIL) 10 MG tablet, Take 10 mg by mouth 3 (three) times daily as needed for Muscle spasms., Disp: , Rfl:     hydroCHLOROthiazide (HYDRODIURIL) 25 MG tablet, Take 12.5 mg by mouth once daily. , Disp: , Rfl:     losartan (COZAAR) 25 MG tablet, Take 25 mg by mouth once daily., Disp: , Rfl:     nitroGLYCERIN (NITROSTAT) 0.4 MG SL tablet, DIS FLORENCE . 5 MIN APART . MAX 3 TS IN 15 MIN, Disp: , Rfl:     omeprazole (PRILOSEC) 20 MG capsule, Take 20 mg by mouth once daily., Disp: , Rfl:     Past Surgical History:   Procedure Laterality Date    ANGIOGRAM, CORONARY,  WITH LEFT HEART CATHETERIZATION  09/02/2020    APPENDECTOMY      TONSILLECTOMY         Social History     Socioeconomic History    Marital status: Single     Spouse name: Not on file    Number of children: Not on file    Years of education: Not on file    Highest education level: Not on file   Occupational History    Not on file   Social Needs    Financial resource strain: Not on file    Food insecurity     Worry: Not on file     Inability: Not on file    Transportation needs     Medical: Not on file     Non-medical: Not on file   Tobacco Use    Smoking status: Never Smoker    Smokeless tobacco: Never Used   Substance and Sexual Activity    Alcohol use: Never     Frequency: Never    Drug use: Never    Sexual activity: Not on file   Lifestyle    Physical activity     Days per week: Not on file     Minutes per session: Not on file    Stress: Not on file   Relationships    Social connections     Talks on phone: Not on file     Gets together: Not on file     Attends Jainism service: Not on file     Active member of club or organization: Not on file     Attends meetings of clubs or organizations: Not on file     Relationship status: Not on file   Other Topics Concern    Not on file   Social History Narrative    Hospital Administration        OBJECTIVE:     Vital Signs Range (Last 24H):  Pulse:  [81]   BP: (140-146)/(80-81)   SpO2:  [95 %]       Significant Labs:  Lab Results   Component Value Date    WBC 6.22 11/05/2020    HGB 11.6 (L) 11/05/2020    HCT 36.5 (L) 11/05/2020     11/05/2020     11/05/2020    K 3.6 11/05/2020     11/05/2020    CREATININE 0.8 11/05/2020    BUN 16 11/05/2020    CO2 24 11/05/2020    INR 1.1 11/05/2020       Diagnostic Studies: No relevant studies.    EKG:   No results found for this or any previous visit.    2D ECHO:  TTE:  Results for orders placed or performed during the hospital encounter of 10/15/20   Echo Color Flow Doppler? Yes   Result Value Ref  Range    BSA 2.01 m2    TDI SEPTAL 0.07 m/s    LV LATERAL E/E' RATIO 8.33 m/s    LV SEPTAL E/E' RATIO 14.29 m/s    LA WIDTH 6.54 cm    TDI LATERAL 0.12 m/s    LVIDd 5.70 3.5 - 6.0 cm    IVS 0.90 (A) 0.6 - 1.1 cm    Posterior Wall 0.90 (A) 0.6 - 1.1 cm    LVIDs 4.50 2.1 - 4.0 cm    FS 21 28 - 44 %    LA volume 189.25 cm3    Sinus 3.70 cm    STJ 3.60 cm    Ascending aorta 3.90 cm    LV mass 197.52 g    LA size 4.86 cm    RVDD 3.84 cm    TAPSE 2.77 cm    RV S' 10.76 cm/s    Left Ventricle Relative Wall Thickness 0.32 cm    AV mean gradient 4 mmHg    AV valve area 2.91 cm2    AV Velocity Ratio 0.58     AV index (prosthetic) 0.68     MV valve area p 1/2 method 6.27 cm2    E/A ratio 1.43     Mean e' 0.10 m/s    E wave decelartion time 120.96 msec    IVRT 54.23 msec    Pulm vein S/D ratio 2.27     LVOT diameter 2.33 cm    LVOT area 4.3 cm2    LVOT peak caleb 0.94 m/s    LVOT peak VTI 17.27 cm    Ao peak caleb 1.61 m/s    Ao VTI 25.33 cm    Mr max caleb 0.06 m/s    LVOT stroke volume 73.60 cm3    AV peak gradient 10 mmHg    E/E' ratio 10.53 m/s    MV Peak E Caleb 1.00 m/s    TR Max Caleb 4.02 m/s    MV stenosis pressure 1/2 time 35.08 ms    MV Peak A Caleb 0.70 m/s    PV Peak S Caleb 0.93 m/s    PV Peak D Caleb 0.41 m/s    LV Systolic Volume 59.96 mL    LV Systolic Volume Index 30.7 mL/m2    LV Diastolic Volume 135.40 mL    LV Diastolic Volume Index 69.34 mL/m2    LA Volume Index 96.9 mL/m2    LV Mass Index 101 g/m2    RA Major Axis 4.62 cm    Left Atrium Minor Axis 7.20 cm    Left Atrium Major Axis 6.82 cm    Triscuspid Valve Regurgitation Peak Gradient 65 mmHg    RA Width 4.70 cm    Right Atrial Pressure (from IVC) 8 mmHg    TV rest pulmonary artery pressure 73 mmHg    LA Volume Index (Mod) 86.5 mL/m2    LA volume (mod) 169.00 cm3    Narrative    · The left ventricle is mildly enlarged with normal systolic function. The   estimated ejection fraction is 60%.  · There is left ventricular eccentric hypertrophy.  · Indeterminate diastolic  function.  · There is suspicion of a posterior leaflet prolapse/flail; however, the   posterior leaflet is not well visualized. There is severe eccentric,   anteriorly directed mitral regurgitation.  · Normal right ventricular systolic function.  · Mild tricuspid regurgitation.  · The estimated PA systolic pressure is 73 mmHg.  · Intermediate central venous pressure (8 mmHg).  · There is pulmonary hypertension.  · Severe left atrial enlargement.          MAGNO:  No results found for this or any previous visit.    ASSESSMENT/PLAN:         Anesthesia Evaluation    I have reviewed the Patient Summary Reports.    I have reviewed the Nursing Notes. I have reviewed the NPO Status.   I have reviewed the Medications.     Review of Systems  Anesthesia Hx:  No problems with previous Anesthesia  History of prior surgery of interest to airway management or planning:  Denies Personal Hx of Anesthesia complications.   Social:  Non-Smoker    Hematology/Oncology:         -- Anemia:   Cardiovascular:   Hypertension  Carotoid Artery Disease    Pulmonary:  Pulmonary Normal    Renal/:  Renal/ Normal     Hepatic/GI:  Hepatic/GI Normal    Neurological:  Neurology Normal    Endocrine:  Endocrine Normal        Physical Exam   Airway/Jaw/Neck:  Airway Findings: Mouth Opening: Normal Tongue: Normal  General Airway Assessment: Adult, Good  Mallampati: III  Improves to II with phonation.  TM Distance: Normal, at least 6 cm       Chest/Lungs:  Chest/Lungs Findings: Normal Respiratory Rate         Mental Status:  Mental Status Findings:  Cooperative, Alert and Oriented         Anesthesia Plan  Type of Anesthesia, risks & benefits discussed:  Anesthesia Type:  MAC  Patient's Preference:   Intra-op Monitoring Plan: arterial line  Intra-op Monitoring Plan Comments:   Post Op Pain Control Plan: multimodal analgesia, IV/PO Opioids PRN and per primary service following discharge from PACU  Post Op Pain Control Plan Comments:   Induction:   IV  Beta  Blocker:  Patient is on a Beta-Blocker and has received one dose within the past 24 hours (No further documentation required).       Informed Consent:  Anesthesia consent signed with patient.  ASA Score: 4     Day of Surgery Review of History & Physical:    H&P update referred to the surgeon.         Ready For Surgery From Anesthesia Perspective.

## 2020-11-05 NOTE — ASSESSMENT & PLAN NOTE
Continue current therapy with Norvasc 5, carvedilol 6.25, losartan 25, HCTZ 12.5. Further management by Dr. Conti.

## 2020-11-05 NOTE — H&P (VIEW-ONLY)
Subjective:    Patient ID:  Jeanmerry Peabody is a 80 y.o. female who presents for follow-up of Mitral Regurgitation      Referring Physician: Dr. Joaquin Conti with [facility]    HPI    Jeanmerry Peabody is a 80 y.o. female referred by Dr Joaquin Conti for evaluation of severe MR (NYHA Class II sx). She is here today to sign consents for MitraClip. She notes approximately starting in January/February she had acute onset dyspnea that was worse with ambulation.  She notes is getting progressively worse.  She lives currently in a motor home with her dog and his noticed that she has increasing short of breath that is now worse with less than or equal to 1 block of ambulation. This improved with rest.  She denies chest pain, lower extremity edema, any hospitalizations.     Mitral Valve Disease Etiology: Degenerative Mitral Regurgitation, Leaflet Flail:  Posterior    The patient has undergone the following MitraClip work-up:    MAGNO 9/2/20: flail P2 leaflet with severe mitral regurgitation, EF 60%   TTE (Date 10/15/20): Severe mitral insufficiency, MVA 6.27 cm2, EF= 60%.   LH (Date 08/2020): normal coronaries    STS: 3.6%    Frailty: 1/4 (failed walk)   CT Surgery risk assessment: high risk, per Dr Malcolm due to functional status.    PFTs: FEV1 64.7% predicted, DLCO 59% predicted.   Comorbidities: CAD, HTN, pulmonary HTN    NYHA: II CCS: 0    Review of Systems   Constitution: Negative for chills and fever.   HENT: Negative for sore throat.    Eyes: Negative for blurred vision.   Cardiovascular: Positive for dyspnea on exertion and leg swelling. Negative for chest pain, claudication, cyanosis, irregular heartbeat, near-syncope, orthopnea, palpitations, paroxysmal nocturnal dyspnea and syncope.   Respiratory: Positive for shortness of breath. Negative for cough and sputum production.    Hematologic/Lymphatic: Does not bruise/bleed easily.   Skin: Negative for itching, rash and suspicious lesions.   Musculoskeletal: Negative  for falls.   Gastrointestinal: Negative for abdominal pain and change in bowel habit.   Genitourinary: Negative for dysuria.   Neurological: Negative for disturbances in coordination, dizziness and loss of balance.   Psychiatric/Behavioral: Negative for altered mental status.          Past Medical History:   Diagnosis Date    Bilateral carotid artery stenosis 10/6/2020    Essential hypertension 10/6/2020    Severe mitral regurgitation 10/6/2020       Current Outpatient Medications:     amLODIPine (NORVASC) 5 MG tablet, Take 5 mg by mouth once daily., Disp: , Rfl:     aspirin (ECOTRIN) 81 MG EC tablet, Take 81 mg by mouth once daily., Disp: , Rfl:     carvediloL (COREG) 6.25 MG tablet, Take 6.25 mg by mouth., Disp: , Rfl:     cyclobenzaprine (FLEXERIL) 10 MG tablet, Take 10 mg by mouth 3 (three) times daily as needed for Muscle spasms., Disp: , Rfl:     hydroCHLOROthiazide (HYDRODIURIL) 25 MG tablet, Take 12.5 mg by mouth once daily. , Disp: , Rfl:     losartan (COZAAR) 25 MG tablet, Take 25 mg by mouth once daily., Disp: , Rfl:     nitroGLYCERIN (NITROSTAT) 0.4 MG SL tablet, DIS FLORENCE . 5 MIN APART . MAX 3 TS IN 15 MIN, Disp: , Rfl:     omeprazole (PRILOSEC) 20 MG capsule, Take 20 mg by mouth once daily., Disp: , Rfl:     Objective:    Physical Exam   Constitutional: She is oriented to person, place, and time. She appears well-developed and well-nourished. No distress.   HENT:   Head: Normocephalic and atraumatic.   Eyes: EOM are normal.   Neck: Normal range of motion. No JVD present.   Cardiovascular: Normal rate, regular rhythm and intact distal pulses.   Murmur heard.  High-pitched blowing holosystolic murmur is present with a grade of 2/6 at the apex.  Pulmonary/Chest: Effort normal and breath sounds normal. No respiratory distress.   Abdominal: Soft. She exhibits no distension.   Musculoskeletal:         General: No edema.   Neurological: She is alert and oriented to person, place, and time.   Skin:  "Skin is warm and dry. She is not diaphoretic.   Vitals reviewed.          Vitals:    11/05/20 1301 11/05/20 1302   BP: (!) 140/80 (!) 146/81   BP Location: Right arm Left arm   Patient Position: Sitting Sitting   BP Method: Large (Automatic) Large (Automatic)   Pulse: 81 81   SpO2: 95%    Weight: 88.2 kg (194 lb 7.1 oz)    Height: 5' 5" (1.651 m)      Body mass index is 32.36 kg/m².    Test(s) Reviewed  I have reviewed the following in detail:  [] Stress test   [] Angiography   [] Echocardiogram   [] Labs:   [] Other:       Assessment:   Severe mitral regurgitation  Jeanmerry Peabody is a 80 y.o. female referred by Dr Joaquin Conti for evaluation of severe MR (NYHA Class II sx).     Mitral Valve Disease Etiology: Degenerative Mitral Regurgitation, Leaflet Flail:  Posterior    The patient has undergone the following MitraClip work-up:    MAGNO 9/2/20: flail P2 leaflet with severe mitral regurgitation, EF 60%   TTE (Date 10/15/20): Severe mitral insufficiency, MVA 6.27 cm2, EF= 60%.   [facility] (Date 08/2020): normal coronaries    STS: 3.6%    Frailty: 1/4 (failed walk)   CT Surgery risk assessment: high risk, per Dr Malcolm due to functional status.    PFTs: FEV1 64.7% predicted, DLCO 59% predicted.   Comorbidities: CAD, HTN, pulmonary HTN    Essential hypertension  Continue current therapy with Norvasc 5, carvedilol 6.25, losartan 25, HCTZ 12.5. Further management by Dr. Conti.     Bilateral carotid artery stenosis  Stable. Asymptomatic. On ASA. No history of TIA/CVA.    Plan:     Proceed with MitraClip as planned on Friday.   The risks, benefits & alternatives of the procedure were explained to the patient.   The risks of MitralClip include but are not limited to: Bleeding, infection, heart rhythm abnormalities, allergic reactions, kidney injury requiring dilaysis, stroke and death.   Informed consent was obtained & the patient is agreeable to proceed with the procedure.         Briseida Real PA-C  Valve and " Structural Heart Disease  [facility]

## 2020-11-05 NOTE — ASSESSMENT & PLAN NOTE
Jeanmerry Peabody is a 80 y.o. female referred by Dr Joaquin Conti for evaluation of severe MR (NYHA Class II sx).     Mitral Valve Disease Etiology: Degenerative Mitral Regurgitation, Leaflet Flail:  Posterior    The patient has undergone the following MitraClip work-up:    MAGNO 9/2/20: flail P2 leaflet with severe mitral regurgitation, EF 60%   TTE (Date 10/15/20): Severe mitral insufficiency, MVA 6.27 cm2, EF= 60%.   [facility] (Date 08/2020): normal coronaries    STS: 3.6%    Frailty: 1/4 (failed walk)   CT Surgery risk assessment: high risk, per Dr Malcolm due to functional status.    PFTs: FEV1 64.7% predicted, DLCO 59% predicted.   Comorbidities: CAD, HTN, pulmonary HTN

## 2020-11-06 ENCOUNTER — HOSPITAL ENCOUNTER (INPATIENT)
Facility: HOSPITAL | Age: 80
LOS: 1 days | Discharge: HOME | DRG: 267 | End: 2020-11-06
Attending: INTERNAL MEDICINE | Admitting: INTERNAL MEDICINE
Payer: MEDICARE

## 2020-11-06 ENCOUNTER — ANESTHESIA (OUTPATIENT)
Dept: MEDSURG UNIT | Facility: HOSPITAL | Age: 80
DRG: 267 | End: 2020-11-06
Payer: MEDICARE

## 2020-11-06 ENCOUNTER — HOSPITAL ENCOUNTER (OUTPATIENT)
Dept: CARDIOLOGY | Facility: HOSPITAL | Age: 80
Discharge: HOME | DRG: 267 | End: 2020-11-06
Attending: INTERNAL MEDICINE
Payer: MEDICARE

## 2020-11-06 VITALS
SYSTOLIC BLOOD PRESSURE: 131 MMHG | BODY MASS INDEX: 27.77 KG/M2 | OXYGEN SATURATION: 94 % | WEIGHT: 194 LBS | TEMPERATURE: 97 F | DIASTOLIC BLOOD PRESSURE: 59 MMHG | HEART RATE: 65 BPM | RESPIRATION RATE: 16 BRPM | HEIGHT: 70 IN

## 2020-11-06 VITALS
DIASTOLIC BLOOD PRESSURE: 76 MMHG | BODY MASS INDEX: 28.73 KG/M2 | WEIGHT: 194 LBS | SYSTOLIC BLOOD PRESSURE: 142 MMHG | HEIGHT: 69 IN

## 2020-11-06 DIAGNOSIS — I34.0 MITRAL VALVE INSUFFICIENCY, UNSPECIFIED ETIOLOGY: ICD-10-CM

## 2020-11-06 DIAGNOSIS — N18.9 CHRONIC KIDNEY DISEASE, UNSPECIFIED CKD STAGE: ICD-10-CM

## 2020-11-06 LAB
ABO + RH BLD: NORMAL
ANION GAP SERPL CALC-SCNC: 8 MMOL/L (ref 8–16)
APTT BLDCRRT: 27.5 SEC (ref 21–32)
BLD GP AB SCN CELLS X3 SERPL QL: NORMAL
BSA FOR ECHO PROCEDURE: 2.07 M2
BUN SERPL-MCNC: 15 MG/DL (ref 8–23)
CALCIUM SERPL-MCNC: 8.9 MG/DL (ref 8.7–10.5)
CHLORIDE SERPL-SCNC: 110 MMOL/L (ref 95–110)
CO2 SERPL-SCNC: 24 MMOL/L (ref 23–29)
CREAT SERPL-MCNC: 0.7 MG/DL (ref 0.5–1.4)
ERYTHROCYTE [DISTWIDTH] IN BLOOD BY AUTOMATED COUNT: 15.3 % (ref 11.5–14.5)
EST. GFR  (AFRICAN AMERICAN): >60 ML/MIN/1.73 M^2
EST. GFR  (NON AFRICAN AMERICAN): >60 ML/MIN/1.73 M^2
GLUCOSE SERPL-MCNC: 103 MG/DL (ref 70–110)
HCT VFR BLD AUTO: 36.2 % (ref 37–48.5)
HGB BLD-MCNC: 11.5 G/DL (ref 12–16)
INR PPP: 1.2 (ref 0.8–1.2)
MCH RBC QN AUTO: 29.5 PG (ref 27–31)
MCHC RBC AUTO-ENTMCNC: 31.8 G/DL (ref 32–36)
MCV RBC AUTO: 93 FL (ref 82–98)
PLATELET # BLD AUTO: 191 K/UL (ref 150–350)
PMV BLD AUTO: 10.9 FL (ref 9.2–12.9)
POTASSIUM SERPL-SCNC: 3.5 MMOL/L (ref 3.5–5.1)
PROTHROMBIN TIME: 12.9 SEC (ref 9–12.5)
RBC # BLD AUTO: 3.9 M/UL (ref 4–5.4)
SODIUM SERPL-SCNC: 142 MMOL/L (ref 136–145)
WBC # BLD AUTO: 5.72 K/UL (ref 3.9–12.7)

## 2020-11-06 PROCEDURE — A4216 STERILE WATER/SALINE, 10 ML: HCPCS | Performed by: STUDENT IN AN ORGANIZED HEALTH CARE EDUCATION/TRAINING PROGRAM

## 2020-11-06 PROCEDURE — 93355 TRANSESOPHAGEAL ECHO (TEE) (CUPID ONLY): ICD-10-PCS | Mod: ,,, | Performed by: INTERNAL MEDICINE

## 2020-11-06 PROCEDURE — 63600175 PHARM REV CODE 636 W HCPCS: Performed by: INTERNAL MEDICINE

## 2020-11-06 PROCEDURE — 93005 ELECTROCARDIOGRAM TRACING: CPT

## 2020-11-06 PROCEDURE — 25000003 PHARM REV CODE 250: Performed by: INTERNAL MEDICINE

## 2020-11-06 PROCEDURE — 93355 ECHO TRANSESOPHAGEAL (TEE): CPT | Mod: ,,, | Performed by: INTERNAL MEDICINE

## 2020-11-06 PROCEDURE — 25000003 PHARM REV CODE 250: Performed by: STUDENT IN AN ORGANIZED HEALTH CARE EDUCATION/TRAINING PROGRAM

## 2020-11-06 PROCEDURE — C1760 CLOSURE DEV, VASC: HCPCS | Performed by: INTERNAL MEDICINE

## 2020-11-06 PROCEDURE — 63600175 PHARM REV CODE 636 W HCPCS: Performed by: STUDENT IN AN ORGANIZED HEALTH CARE EDUCATION/TRAINING PROGRAM

## 2020-11-06 PROCEDURE — 37000009 HC ANESTHESIA EA ADD 15 MINS: Performed by: INTERNAL MEDICINE

## 2020-11-06 PROCEDURE — 37000008 HC ANESTHESIA 1ST 15 MINUTES: Performed by: INTERNAL MEDICINE

## 2020-11-06 PROCEDURE — 33418 REPAIR TCAT MITRAL VALVE: CPT | Mod: Q0,,, | Performed by: INTERNAL MEDICINE

## 2020-11-06 PROCEDURE — 71000033 HC RECOVERY, INTIAL HOUR: Performed by: INTERNAL MEDICINE

## 2020-11-06 PROCEDURE — 33418 REPAIR TCAT MITRAL VALVE: CPT | Performed by: INTERNAL MEDICINE

## 2020-11-06 PROCEDURE — 93325 DOPPLER ECHO COLOR FLOW MAPG: CPT

## 2020-11-06 PROCEDURE — 93010 EKG 12-LEAD: ICD-10-PCS | Mod: ,,, | Performed by: INTERNAL MEDICINE

## 2020-11-06 PROCEDURE — 80048 BASIC METABOLIC PNL TOTAL CA: CPT

## 2020-11-06 PROCEDURE — 36620 ARTERIAL: ICD-10-PCS | Mod: 59,,, | Performed by: ANESTHESIOLOGY

## 2020-11-06 PROCEDURE — C1894 INTRO/SHEATH, NON-LASER: HCPCS | Performed by: INTERNAL MEDICINE

## 2020-11-06 PROCEDURE — C1769 GUIDE WIRE: HCPCS | Performed by: INTERNAL MEDICINE

## 2020-11-06 PROCEDURE — 63600175 PHARM REV CODE 636 W HCPCS: Performed by: ANESTHESIOLOGY

## 2020-11-06 PROCEDURE — 36620 INSERTION CATHETER ARTERY: CPT | Mod: 59,,, | Performed by: ANESTHESIOLOGY

## 2020-11-06 PROCEDURE — 71000039 HC RECOVERY, EACH ADD'L HOUR: Performed by: INTERNAL MEDICINE

## 2020-11-06 PROCEDURE — 33418 PR REPAIR MITRAL VALVE PERC APPRCH, INCL TRANSSEPTAL PUNCTRE;INITIAL: ICD-10-PCS | Mod: Q0,,, | Performed by: INTERNAL MEDICINE

## 2020-11-06 PROCEDURE — 85027 COMPLETE CBC AUTOMATED: CPT

## 2020-11-06 PROCEDURE — 85610 PROTHROMBIN TIME: CPT

## 2020-11-06 PROCEDURE — D9220A PRA ANESTHESIA: ICD-10-PCS | Mod: Q0,,, | Performed by: ANESTHESIOLOGY

## 2020-11-06 PROCEDURE — 27201423 OPTIME MED/SURG SUP & DEVICES STERILE SUPPLY: Performed by: INTERNAL MEDICINE

## 2020-11-06 PROCEDURE — 86850 RBC ANTIBODY SCREEN: CPT

## 2020-11-06 PROCEDURE — C1887 CATHETER, GUIDING: HCPCS | Performed by: INTERNAL MEDICINE

## 2020-11-06 PROCEDURE — 93010 ELECTROCARDIOGRAM REPORT: CPT | Mod: ,,, | Performed by: INTERNAL MEDICINE

## 2020-11-06 PROCEDURE — 85730 THROMBOPLASTIN TIME PARTIAL: CPT

## 2020-11-06 PROCEDURE — 11000001 HC ACUTE MED/SURG PRIVATE ROOM

## 2020-11-06 PROCEDURE — D9220A PRA ANESTHESIA: Mod: Q0,,, | Performed by: ANESTHESIOLOGY

## 2020-11-06 DEVICE — SYS MITRACLIP GUIDE CATH 1CLIP: Type: IMPLANTABLE DEVICE | Site: HEART | Status: FUNCTIONAL

## 2020-11-06 RX ORDER — HEPARIN SODIUM 5000 [USP'U]/ML
INJECTION, SOLUTION INTRAVENOUS; SUBCUTANEOUS
Status: DISCONTINUED | OUTPATIENT
Start: 2020-11-06 | End: 2020-11-06

## 2020-11-06 RX ORDER — LIDOCAINE HYDROCHLORIDE 20 MG/ML
INJECTION, SOLUTION INFILTRATION; PERINEURAL
Status: DISCONTINUED | OUTPATIENT
Start: 2020-11-06 | End: 2020-11-06 | Stop reason: HOSPADM

## 2020-11-06 RX ORDER — PROPOFOL 10 MG/ML
VIAL (ML) INTRAVENOUS CONTINUOUS PRN
Status: DISCONTINUED | OUTPATIENT
Start: 2020-11-06 | End: 2020-11-06

## 2020-11-06 RX ORDER — FLUCONAZOLE 2 MG/ML
INJECTION, SOLUTION INTRAVENOUS
Status: DISCONTINUED | OUTPATIENT
Start: 2020-11-06 | End: 2020-11-06

## 2020-11-06 RX ORDER — DIPHENHYDRAMINE HCL 50 MG
50 CAPSULE ORAL ONCE
Status: COMPLETED | OUTPATIENT
Start: 2020-11-06 | End: 2020-11-06

## 2020-11-06 RX ORDER — PROPOFOL 10 MG/ML
VIAL (ML) INTRAVENOUS
Status: DISCONTINUED | OUTPATIENT
Start: 2020-11-06 | End: 2020-11-06

## 2020-11-06 RX ORDER — MEPERIDINE HYDROCHLORIDE 50 MG/ML
12.5 INJECTION INTRAMUSCULAR; INTRAVENOUS; SUBCUTANEOUS ONCE AS NEEDED
Status: CANCELLED | OUTPATIENT
Start: 2020-11-06 | End: 2020-11-07

## 2020-11-06 RX ORDER — ONDANSETRON 2 MG/ML
4 INJECTION INTRAMUSCULAR; INTRAVENOUS DAILY PRN
Status: CANCELLED | OUTPATIENT
Start: 2020-11-06

## 2020-11-06 RX ORDER — DEXMEDETOMIDINE HYDROCHLORIDE 100 UG/ML
INJECTION, SOLUTION INTRAVENOUS
Status: DISCONTINUED | OUTPATIENT
Start: 2020-11-06 | End: 2020-11-06

## 2020-11-06 RX ORDER — FENTANYL CITRATE 50 UG/ML
INJECTION, SOLUTION INTRAMUSCULAR; INTRAVENOUS
Status: DISCONTINUED | OUTPATIENT
Start: 2020-11-06 | End: 2020-11-06

## 2020-11-06 RX ORDER — HYDROMORPHONE HYDROCHLORIDE 1 MG/ML
0.2 INJECTION, SOLUTION INTRAMUSCULAR; INTRAVENOUS; SUBCUTANEOUS EVERY 5 MIN PRN
Status: CANCELLED | OUTPATIENT
Start: 2020-11-06

## 2020-11-06 RX ORDER — SODIUM CHLORIDE 9 MG/ML
3 INJECTION, SOLUTION INTRAVENOUS CONTINUOUS
Status: ACTIVE | OUTPATIENT
Start: 2020-11-06 | End: 2020-11-06

## 2020-11-06 RX ORDER — SODIUM CHLORIDE 0.9 % (FLUSH) 0.9 %
3 SYRINGE (ML) INJECTION
Status: CANCELLED | OUTPATIENT
Start: 2020-11-06

## 2020-11-06 RX ORDER — CEFAZOLIN SODIUM 1 G/3ML
INJECTION, POWDER, FOR SOLUTION INTRAMUSCULAR; INTRAVENOUS
Status: DISCONTINUED | OUTPATIENT
Start: 2020-11-06 | End: 2020-11-06

## 2020-11-06 RX ORDER — PROTAMINE SULFATE 10 MG/ML
INJECTION, SOLUTION INTRAVENOUS
Status: DISCONTINUED | OUTPATIENT
Start: 2020-11-06 | End: 2020-11-06

## 2020-11-06 RX ORDER — HEPARIN SODIUM 200 [USP'U]/100ML
INJECTION, SOLUTION INTRAVENOUS
Status: DISCONTINUED | OUTPATIENT
Start: 2020-11-06 | End: 2020-11-06 | Stop reason: HOSPADM

## 2020-11-06 RX ADMIN — DIPHENHYDRAMINE HYDROCHLORIDE 50 MG: 50 CAPSULE ORAL at 07:11

## 2020-11-06 RX ADMIN — PROPOFOL 10 MG: 10 INJECTION, EMULSION INTRAVENOUS at 08:11

## 2020-11-06 RX ADMIN — PROPOFOL 20 MG: 10 INJECTION, EMULSION INTRAVENOUS at 08:11

## 2020-11-06 RX ADMIN — PROTAMINE SULFATE 50 MG: 10 INJECTION, SOLUTION INTRAVENOUS at 09:11

## 2020-11-06 RX ADMIN — HEPARIN SODIUM 9000 UNITS: 5000 INJECTION INTRAVENOUS; SUBCUTANEOUS at 08:11

## 2020-11-06 RX ADMIN — DEXMEDETOMIDINE HYDROCHLORIDE 1 MCG/KG/HR: 100 INJECTION, SOLUTION, CONCENTRATE INTRAVENOUS at 08:11

## 2020-11-06 RX ADMIN — FENTANYL CITRATE 50 MCG: 50 INJECTION, SOLUTION INTRAMUSCULAR; INTRAVENOUS at 08:11

## 2020-11-06 RX ADMIN — DEXMEDETOMIDINE HYDROCHLORIDE 88 MCG: 100 INJECTION, SOLUTION, CONCENTRATE INTRAVENOUS at 08:11

## 2020-11-06 RX ADMIN — FLUCONAZOLE 400 MG: 2 INJECTION, SOLUTION INTRAVENOUS at 08:11

## 2020-11-06 RX ADMIN — PROPOFOL 100 MCG/KG/MIN: 10 INJECTION, EMULSION INTRAVENOUS at 08:11

## 2020-11-06 RX ADMIN — CEFAZOLIN 2 G: 1 INJECTION, POWDER, FOR SOLUTION INTRAMUSCULAR; INTRAVENOUS at 08:11

## 2020-11-06 RX ADMIN — SODIUM CHLORIDE, SODIUM GLUCONATE, SODIUM ACETATE, POTASSIUM CHLORIDE, MAGNESIUM CHLORIDE, SODIUM PHOSPHATE, DIBASIC, AND POTASSIUM PHOSPHATE: .53; .5; .37; .037; .03; .012; .00082 INJECTION, SOLUTION INTRAVENOUS at 07:11

## 2020-11-06 NOTE — Clinical Note
The sheath is inserted through the larger sheath in the right femoral vein.  And repositioned to the RA.

## 2020-11-06 NOTE — INTERVAL H&P NOTE
The patient has been examined and the H&P has been reviewed:    I concur with the findings and no changes have occurred since H&P was written.    Anesthesia risks, benefits and alternative options discussed and understood by patient/family.    Referring Physician: Dr. Joaquin Conti with [facility]    HPI  Jeanmerry Peabody is a 80 y.o. female referred by Dr Joaquin Conti for evaluation of severe MR (NYHA Class II sx). She is here today to sign consents for MitraClip. She notes approximately starting in January/February she had acute onset dyspnea that was worse with ambulation.  She notes is getting progressively worse.  She lives currently in a motor home with her dog and his noticed that she has increasing short of breath that is now worse with less than or equal to 1 block of ambulation. This improved with rest.  She denies chest pain, lower extremity edema, any hospitalizations.    Mitral Valve Disease Etiology: Degenerative Mitral Regurgitation, Leaflet Flail:  Posterior  The patient has undergone the following MitraClip work-up:   · MAGNO 9/2/20: flail P2 leaflet with severe mitral regurgitation, EF 60%  · TTE (Date 10/15/20): Severe mitral insufficiency, MVA 6.27 cm2, EF= 60%.  · [facility] (Date 08/2020): normal coronaries   · STS: 3.6%   · Frailty: 1/4 (failed walk)  · CT Surgery risk assessment: high risk, per Dr Malcolm due to functional status.   · PFTs: FEV1 64.7% predicted, DLCO 59% predicted.  · Comorbidities: CAD, HTN, pulmonary HTN     NYHA: II CCS: 0     Plan:   - Mitraclip placement today  - Anti-platelet Therapy:  - Access: R CFV access  - Catheters: Pigtail, BIANCA, glide,   - Creatinine/CrCl:   - Allergies: No shellfish / Iodine allergy  - Pre-Hydration: NS  - Pre-Op Med: Bendaryl 50mg pO   - All patient's questions were answered.  The risks, benefits & alternatives of the procedure were explained to the patient.   The risks of MitralClip include but are not limited to: Bleeding, infection, heart rhythm abnormalities,  allergic reactions, kidney injury requiring dilaysis, stroke and death.   Informed consent was obtained & the patient is agreeable to proceed with the procedure.           There are no hospital problems to display for this patient.

## 2020-11-06 NOTE — DISCHARGE INSTRUCTIONS
Mitralclip  Discharge Instructions     Preventing Infection on your Mitralclip device  o You have been given a card:  PREVENTION OF INFECTIVE BACTERIAL ENDOCARDITIS  o Provide a copy of this card to your Dentist and Gastroenterologist to keep in your chart.  o You DO need to take antibiotics prior to any routine dental cleaning, GI procedures (colonoscopy, endoscopy),  (cystoscopy, bladder procedures), or respiratory procedures (bronchoscopy).  o You need antibiotics if you are having a procedure that requires cutting into infected skin (lancing a boil).   o Your Dentist or Gastroenterologist will prescribe these for you prior to your appointment. Should you have any issues getting these prescribed, please call our office.   General Post-Op Instructions  o No lifting > 5 pounds for 5 days  o No driving for 5 days  o You may shower as soon as you get home but no bathing or submerging in water (lakes, pools, tub, etc.) for 1 week. You can remove the dressing and replace it with a band-aid.  o Keep the incision clean and dry. No lotions, oils, creams, or powders. You may change the band-aid daily until a scab has formed over.   Follow Up  o You can continue to follow up with your general Cardiologist.  o The following are requirements after your procedure:  - In 1 month you will have an echo, lab work, and a clinic visit at the [facility].  - At 1 year, you will have an echo, lab work, and an office visit at the [facility].   Discharge  o If you have any questions or concerns after discharge, please do not hesitate to call our office and speak with a health care representative. [phone]  o If you have any problems or concerns related to your procedure, please call our office. Please address any other concerns with your primary Cardiologist.

## 2020-11-06 NOTE — PLAN OF CARE
Received report from Deborah. Patient s/p mitral clip, AAOx3. VSS, no c/o pain or discomfort at this time, resp even and unlabored. Gauze/tegaderm dressing to R groin is CDI. No active bleeding. No hematoma noted. Post procedure protocol reviewed with patient and patient's family. Understanding verbalized. Family members at bedside. Nurse call bell within reach. Will continue to monitor per post procedure protocol.

## 2020-11-06 NOTE — PROGRESS NOTES
PATIENT ADMITTED TO RECOVERY SEE [facility] FOR COMPLETE ASSESSMENT PACU BCG'S MAINTAINED SAFETY MEASURES VERIFIED PATIENT SEDATED AT THIS TIME WITH NASAL TRUMPET TO RIGHT NARE CALLED FRIEND'S NUMBER ON CHART NO ANSWER AND CALLED WAITING ROOM AND NO FAMILY/FRIEND THERE WILL TRY AGAIN LATER. NO DISTRESS NOTED.

## 2020-11-06 NOTE — TRANSFER OF CARE
"Anesthesia Transfer of Care Note    Patient: Jeanmerry Peabody    Procedure(s) Performed: Procedure(s) (LRB):  Mitral clip (N/A)    Patient location: PACU    Anesthesia Type: general    Transport from OR: Transported from OR on 6-10 L/min O2 by face mask with adequate spontaneous ventilation    Post pain: adequate analgesia    Post assessment: no apparent anesthetic complications    Post vital signs: stable    Level of consciousness: awake and responds to stimulation    Nausea/Vomiting: no nausea/vomiting    Complications: none    Transfer of care protocol was followed      Last vitals:   Visit Vitals  /62 (BP Location: Right arm, Patient Position: Lying)   Pulse (!) 59   Temp 36.6 °C (97.9 °F) (Temporal)   Resp 18   Ht 5' 9.5" (1.765 m)   Wt 88 kg (194 lb)   SpO2 100%   Breastfeeding No   BMI 28.24 kg/m²     "

## 2020-11-06 NOTE — CONSULTS
[facility]  Cardiology  Consult Note    Patient Name: Jeanmerry Peabody  MRN: [id number]  Admission Date: 11/6/2020  Hospital Length of Stay: 0 days  Code Status: No Order   Attending Provider: Alexandr Everett MD   Consulting Provider: Marc Jaimes MD  Primary Care Physician: Primary Doctor No  Principal Problem:<principal problem not specified>    Patient information was obtained from patient and ER records.     Consults  Subjective:     Chief Complaint:  Mitral Regurgitation     HPI:   80 year old female here for MitraClip, she has HTN, severe MR and progressively worsening dyspnea. Dyspnea now occurs with little effort or walking a short distance. She denies angina, orthopnea, PND or edema. She has occasional palpitations, denies syncope.    TTE 10/15/20    Conclusion    · The left ventricle is mildly enlarged with normal systolic function. The estimated ejection fraction is 60%.  · There is left ventricular eccentric hypertrophy.  · Indeterminate diastolic function.  · There is suspicion of a posterior leaflet prolapse/flail; however, the posterior leaflet is not well visualized. There is severe eccentric, anteriorly directed mitral regurgitation.  · Normal right ventricular systolic function.  · Mild tricuspid regurgitation.  · The estimated PA systolic pressure is 73 mmHg.  · Intermediate central venous pressure (8 mmHg).  · There is pulmonary hypertension.  · Severe left atrial enlargement.    Dysphagia or odynophagia:  No  Liver Disease, esophageal disease, or known varices:  No  Upper GI Bleeding: No  Snoring:  No  Sleep Apnea:  No  Prior neck surgery or radiation:  No  History of anesthetic difficulties:  No  Family history of anesthetic difficulties:  No  Last oral intake:  12 hours ago  Able to move neck in all directions:  Yes        Past Medical History:   Diagnosis Date    Bilateral carotid artery stenosis 10/6/2020    Essential hypertension 10/6/2020     Severe mitral regurgitation 10/6/2020       Past Surgical History:   Procedure Laterality Date    ANGIOGRAM, CORONARY, WITH LEFT HEART CATHETERIZATION  09/02/2020    APPENDECTOMY      TONSILLECTOMY         Review of patient's allergies indicates:  No Known Allergies    No current facility-administered medications on file prior to encounter.      Current Outpatient Medications on File Prior to Encounter   Medication Sig    amLODIPine (NORVASC) 5 MG tablet Take 5 mg by mouth once daily.    aspirin (ECOTRIN) 81 MG EC tablet Take 81 mg by mouth once daily.    carvediloL (COREG) 6.25 MG tablet Take 6.25 mg by mouth.    cyclobenzaprine (FLEXERIL) 10 MG tablet Take 10 mg by mouth 3 (three) times daily as needed for Muscle spasms.    hydroCHLOROthiazide (HYDRODIURIL) 25 MG tablet Take 12.5 mg by mouth once daily.     losartan (COZAAR) 25 MG tablet Take 25 mg by mouth once daily.    nitroGLYCERIN (NITROSTAT) 0.4 MG SL tablet DIS FLORENCE . 5 MIN APART . MAX 3 TS IN 15 MIN    omeprazole (PRILOSEC) 20 MG capsule Take 20 mg by mouth once daily.     Family History     Problem Relation (Age of Onset)    Brain cancer Brother    COPD Mother        Tobacco Use    Smoking status: Never Smoker    Smokeless tobacco: Never Used   Substance and Sexual Activity    Alcohol use: Never     Frequency: Never    Drug use: Never    Sexual activity: Not on file     Review of Systems   All other systems reviewed and are negative.    Objective:     Vital Signs (Most Recent):    Vital Signs (24h Range):  Pulse:  [81] 81  SpO2:  [95 %] 95 %  BP: (140-146)/(80-81) 146/81        There is no height or weight on file to calculate BMI.            No intake or output data in the 24 hours ending 11/06/20 0641    Lines/Drains/Airways     None                 Physical Exam   Constitutional: She is oriented to person, place, and time. She appears well-developed and well-nourished. No distress.   HENT:   Head: Normocephalic and atraumatic.   Eyes:  Pupils are equal, round, and reactive to light. EOM are normal.   Neck: Normal range of motion. No JVD present.   Cardiovascular: Normal rate, regular rhythm and intact distal pulses. Exam reveals no gallop and no friction rub.   Murmur (Grade III/VI holosystolic murmur, loudest at the apex) heard.  Pulmonary/Chest: Effort normal and breath sounds normal. No respiratory distress. She has no wheezes. She has no rales.   Abdominal: Soft. Bowel sounds are normal. She exhibits no distension. There is no abdominal tenderness.   Musculoskeletal: Normal range of motion.         General: No edema.   Neurological: She is alert and oriented to person, place, and time.   Skin: Skin is warm. No rash noted. She is not diaphoretic.   Psychiatric: She has a normal mood and affect. Her behavior is normal.       Significant Labs:     Recent Results (from the past 24 hour(s))   COVID-19 Routine Screening    Collection Time: 11/05/20 11:40 AM   Result Value Ref Range    SARS-CoV2 (COVID-19) Qualitative PCR Not Detected Not Detected   Albumin    Collection Time: 11/05/20 12:03 PM   Result Value Ref Range    Albumin 4.0 3.5 - 5.2 g/dL   APTT    Collection Time: 11/05/20 12:03 PM   Result Value Ref Range    aPTT 27.8 21.0 - 32.0 sec   CBC auto differential    Collection Time: 11/05/20 12:03 PM   Result Value Ref Range    WBC 6.22 3.90 - 12.70 K/uL    RBC 3.94 (L) 4.00 - 5.40 M/uL    Hemoglobin 11.6 (L) 12.0 - 16.0 g/dL    Hematocrit 36.5 (L) 37.0 - 48.5 %    MCV 93 82 - 98 fL    MCH 29.4 27.0 - 31.0 pg    MCHC 31.8 (L) 32.0 - 36.0 g/dL    RDW 15.1 (H) 11.5 - 14.5 %    Platelets 218 150 - 350 K/uL    MPV 10.8 9.2 - 12.9 fL    Immature Granulocytes 0.3 0.0 - 0.5 %    Gran # (ANC) 4.0 1.8 - 7.7 K/uL    Immature Grans (Abs) 0.02 0.00 - 0.04 K/uL    Lymph # 1.5 1.0 - 4.8 K/uL    Mono # 0.5 0.3 - 1.0 K/uL    Eos # 0.2 0.0 - 0.5 K/uL    Baso # 0.08 0.00 - 0.20 K/uL    nRBC 0 0 /100 WBC    Gran % 64.4 38.0 - 73.0 %    Lymph % 24.0 18.0 - 48.0 %     Mono % 7.4 4.0 - 15.0 %    Eosinophil % 2.6 0.0 - 8.0 %    Basophil % 1.3 0.0 - 1.9 %    Differential Method Automated    Basic Metabolic Panel    Collection Time: 11/05/20 12:03 PM   Result Value Ref Range    Sodium 143 136 - 145 mmol/L    Potassium 3.6 3.5 - 5.1 mmol/L    Chloride 109 95 - 110 mmol/L    CO2 24 23 - 29 mmol/L    Glucose 100 70 - 110 mg/dL    BUN 16 8 - 23 mg/dL    Creatinine 0.8 0.5 - 1.4 mg/dL    Calcium 8.7 8.7 - 10.5 mg/dL    Anion Gap 10 8 - 16 mmol/L    eGFR if African American >60.0 >60 mL/min/1.73 m^2    eGFR if non African American >60.0 >60 mL/min/1.73 m^2   Protime-INR    Collection Time: 11/05/20 12:03 PM   Result Value Ref Range    Prothrombin Time 12.6 (H) 9.0 - 12.5 sec    INR 1.1 0.8 - 1.2         Significant Imaging:     I have reviewed all pertinent imaging studies      Assessment and Plan:     Severe mitral regurgitation  1. MAGNO for evaluation of MitraClip  -No absolute contraindications of esophageal stricture, tumor, perforation, laceration,or diverticulum and/or active GI bleed  -The risks, benefits & alternatives of the procedure were explained to the patient.   -The risks of transesophageal echo include but are not limited to:  Dental trauma, esophageal trauma/perforation, bleeding, laryngospasm/brochospasm, aspiration, sore throat/hoarseness, & dislodgement of the endotracheal tube/nasogastric tube (where applicable).    -The risks of moderate sedation include hypotension, respiratory depression, arrhythmias, bronchospasm, & death.    -Informed consent was obtained. The patient is agreeable to proceed with the procedure and all questions and concerns addressed.    Case discussed with an attending in echocardiography lab.     Further recommendations per attending addendum          VTE Risk Mitigation (From admission, onward)    None          Marc Jaimes MD  Cardiology   [facility]

## 2020-11-06 NOTE — DISCHARGE SUMMARY
[facility]  Interventional Cardiology  Discharge Summary      Patient Name: Jeanmerry Peabody  MRN: [id number]  Admission Date: 11/6/2020  Hospital Length of Stay: 0 days  Discharge Date and Time:  11/06/2020 11:26 AM  Attending Physician: Alexandr Everett MD  Discharging Provider: Adarsh Saucedo MD  Primary Care Physician: Primary Doctor No    HPI:        Procedure(s) (LRB):  Mitral clip (N/A)     Indwelling Lines/Drains at time of discharge:  Lines/Drains/Airways     None                 Hospital Course:   Patient underwent right common femoral vein MitraClip placement.  MAGNO guidance used.  Patient tolerated procedure well reviewed MR a significant improvement in his mitral regurgitation 1+ to 1+.  Her patient will be monitored in observation.  Will be discharged home today.     -continue medical management no changes  -will follow up Dr. Everett in clinic ultrasound-will need a transthoracic echocardiogram on follow-up           Significant Diagnostic Studies: Labs:   CMP   Recent Labs   Lab 11/05/20  1203 11/06/20  0642    142   K 3.6 3.5    110   CO2 24 24    103   BUN 16 15   CREATININE 0.8 0.7   CALCIUM 8.7 8.9   ALBUMIN 4.0  --    ANIONGAP 10 8   ESTGFRAFRICA >60.0 >60.0   EGFRNONAA >60.0 >60.0   , CBC   Recent Labs   Lab 11/05/20  1203 11/06/20  0642   WBC 6.22 5.72   HGB 11.6* 11.5*   HCT 36.5* 36.2*    191   , INR   Lab Results   Component Value Date    INR 1.2 11/06/2020    INR 1.1 11/05/2020    and Lipid Panel No results found for: CHOL, HDL, LDLCALC, TRIG, CHOLHDL    Pending Diagnostic Studies:     None        No new Assessment & Plan notes have been filed under this hospital service since the last note was generated.  Service: Interventional Cardiology      Discharged Condition: good    Follow Up:    Patient Instructions:   No discharge procedures on file.  Medications:  Transfer Medications (for Discharge Readmit only):   Current Facility-Administered  Medications   Medication Dose Route Frequency Provider Last Rate Last Dose    heparin (porcine) 2,000 Units in sodium chloride 0.9% 1,000 mL    PRN Alexandr Everett MD   2,000 mL at 11/06/20 0842    heparin infusion 1,000 units/500 ml in 0.9% NaCl (pressure line flush)    Continuous PRN Alexandr Everett MD   2,000 mL at 11/06/20 0806    lidocaine HCL 20 mg/ml (2%) injection    PRN Alexandr Everett MD   20 mL at 11/06/20 0806       Time spent on the discharge of patient: 55 minutes    Adarsh Saucedo MD  Interventional Cardiology  [facility]

## 2020-11-06 NOTE — NURSING TRANSFER
Nursing Transfer Note      11/6/2020     Transfer To: SSCU 10    Transfer via stretcher    Transfer with cardiac monitoring    Transported by NINO PATRICIO    Medicines sent: NONE    Chart send with patient: Yes    Notified: REPORTED TO MALIK PATRICIO    Patient reassessed at: SEE EPIC (date, time)    Upon arrival to floor: TO ROOM NO COMPLAINTS NO DISTRESS NOTED.

## 2020-11-06 NOTE — ANESTHESIA PROCEDURE NOTES
Arterial    Diagnosis: Mitral regurgitation    Patient location during procedure: done in OR  Procedure start time: 11/6/2020 8:04 AM  Timeout: 11/6/2020 8:03 AM  Procedure end time: 11/6/2020 8:05 AM    Staffing  Authorizing Provider: Sonu Lopez Jr., MD  Performing Provider: Vipin Lopez MD    Anesthesiologist was present at the time of the procedure.    Preanesthetic Checklist  Completed: patient identified, site marked, surgical consent, pre-op evaluation, timeout performed, IV checked, risks and benefits discussed, monitors and equipment checked and anesthesia consent givenArterial  Skin Prep: chlorhexidine gluconate  Local Infiltration: lidocaine  Orientation: right  Location: radial  Catheter Size: 20 G  Catheter placement by Anatomical landmarks. Heme positive aspiration all ports.Insertion Attempts: 1  Assessment  Dressing: secured with tape and tegaderm  Patient: Tolerated well

## 2020-11-06 NOTE — ANESTHESIA POSTPROCEDURE EVALUATION
Anesthesia Post Evaluation    Patient: Jeanmerry Peabody    Procedure(s) Performed: Procedure(s) (LRB):  Mitral clip (N/A)    Final Anesthesia Type: general    Patient location during evaluation: PACU  Patient participation: Yes- Able to Participate  Level of consciousness: awake and alert  Post-procedure vital signs: reviewed and stable  Pain management: adequate  Airway patency: patent    PONV status at discharge: No PONV  Anesthetic complications: no      Cardiovascular status: blood pressure returned to baseline and hemodynamically stable  Respiratory status: unassisted, spontaneous ventilation and room air  Hydration status: euvolemic  Follow-up not needed.          Vitals Value Taken Time   /59 11/06/20 1245   Temp 36.3 °C (97.4 °F) 11/06/20 1210   Pulse 65 11/06/20 1245   Resp 16 11/06/20 1210   SpO2 94 % 11/06/20 1210         Event Time   Out of Recovery 12:00:00         Pain/Paula Score: Paula Score: 10 (11/6/2020 12:30 PM)

## 2020-11-06 NOTE — HPI
80 year old female here for MitraClip, she has HTN, severe MR and progressively worsening dyspnea. Dyspnea now occurs with little effort or walking a short distance. She denies angina, orthopnea, PND or edema. She has occasional palpitations, denies syncope.    TTE 10/15/20    Conclusion    · The left ventricle is mildly enlarged with normal systolic function. The estimated ejection fraction is 60%.  · There is left ventricular eccentric hypertrophy.  · Indeterminate diastolic function.  · There is suspicion of a posterior leaflet prolapse/flail; however, the posterior leaflet is not well visualized. There is severe eccentric, anteriorly directed mitral regurgitation.  · Normal right ventricular systolic function.  · Mild tricuspid regurgitation.  · The estimated PA systolic pressure is 73 mmHg.  · Intermediate central venous pressure (8 mmHg).  · There is pulmonary hypertension.  · Severe left atrial enlargement.    Dysphagia or odynophagia:  No  Liver Disease, esophageal disease, or known varices:  No  Upper GI Bleeding: No  Snoring:  No  Sleep Apnea:  No  Prior neck surgery or radiation:  No  History of anesthetic difficulties:  No  Family history of anesthetic difficulties:  No  Last oral intake:  12 hours ago  Able to move neck in all directions:  Yes

## 2020-11-09 LAB
POC ACTIVATED CLOTTING TIME K: 279 SEC (ref 74–137)
SAMPLE: ABNORMAL

## 2020-11-18 DIAGNOSIS — I34.0 SEVERE MITRAL REGURGITATION: Primary | ICD-10-CM

## 2020-12-08 ENCOUNTER — HOSPITAL ENCOUNTER (OUTPATIENT)
Dept: CARDIOLOGY | Facility: HOSPITAL | Age: 80
Discharge: HOME | End: 2020-12-08
Attending: INTERNAL MEDICINE
Payer: MEDICARE

## 2020-12-08 VITALS
BODY MASS INDEX: 28.73 KG/M2 | HEART RATE: 86 BPM | HEIGHT: 69 IN | WEIGHT: 194 LBS | DIASTOLIC BLOOD PRESSURE: 76 MMHG | SYSTOLIC BLOOD PRESSURE: 142 MMHG

## 2020-12-08 DIAGNOSIS — I34.0 SEVERE MITRAL REGURGITATION: ICD-10-CM

## 2020-12-08 PROBLEM — Z95.818 S/P MITRAL VALVE CLIP IMPLANTATION: Status: ACTIVE | Noted: 2020-12-08

## 2020-12-08 PROBLEM — Z98.890 S/P MITRAL VALVE CLIP IMPLANTATION: Status: ACTIVE | Noted: 2020-12-08

## 2020-12-08 LAB
ASCENDING AORTA: 3.47 CM
AV INDEX (PROSTH): 0.73
AV MEAN GRADIENT: 6 MMHG
AV PEAK GRADIENT: 11 MMHG
AV VALVE AREA: 2.79 CM2
AV VELOCITY RATIO: 0.75
BSA FOR ECHO PROCEDURE: 2.07 M2
CV ECHO LV RWT: 0.43 CM
DOP CALC AO PEAK VEL: 1.69 M/S
DOP CALC AO VTI: 33.52 CM
DOP CALC LVOT AREA: 3.8 CM2
DOP CALC LVOT DIAMETER: 2.21 CM
DOP CALC LVOT PEAK VEL: 1.26 M/S
DOP CALC LVOT STROKE VOLUME: 93.63 CM3
DOP CALCLVOT PEAK VEL VTI: 24.42 CM
E WAVE DECELERATION TIME: 248.06 MSEC
E/A RATIO: 0.62
E/E' RATIO: 10.89 M/S
ECHO LV POSTERIOR WALL: 1.12 CM (ref 0.6–1.1)
FRACTIONAL SHORTENING: 26 % (ref 28–44)
HR TR ECHO: 85 BPM
INTERVENTRICULAR SEPTUM: 1.09 CM (ref 0.6–1.1)
LA MAJOR: 4.83 CM
LA MINOR: 4.91 CM
LA WIDTH: 3.12 CM
LEFT ATRIUM SIZE: 4.78 CM
LEFT ATRIUM VOLUME INDEX MOD: 43.7 ML/M2
LEFT ATRIUM VOLUME INDEX: 30.3 ML/M2
LEFT ATRIUM VOLUME MOD: 89.11 CM3
LEFT ATRIUM VOLUME: 61.73 CM3
LEFT INTERNAL DIMENSION IN SYSTOLE: 3.85 CM (ref 2.1–4)
LEFT VENTRICLE DIASTOLIC VOLUME INDEX: 64.06 ML/M2
LEFT VENTRICLE DIASTOLIC VOLUME: 130.63 ML
LEFT VENTRICLE MASS INDEX: 110 G/M2
LEFT VENTRICLE SYSTOLIC VOLUME INDEX: 31.4 ML/M2
LEFT VENTRICLE SYSTOLIC VOLUME: 64.07 ML
LEFT VENTRICULAR INTERNAL DIMENSION IN DIASTOLE: 5.22 CM (ref 3.5–6)
LEFT VENTRICULAR MASS: 223.52 G
LV LATERAL E/E' RATIO: 10.89 M/S
LV SEPTAL E/E' RATIO: 10.89 M/S
MV A" WAVE DURATION": 6.57 MSEC
MV MEAN GRADIENT: 5 MMHG
MV PEAK A VEL: 1.58 M/S
MV PEAK E VEL: 0.98 M/S
PISA MRMAX VEL: 0.06 M/S
PISA TR MAX VEL: 2.8 M/S
PULM VEIN S/D RATIO: 1.62
PV PEAK D VEL: 0.34 M/S
PV PEAK S VEL: 0.55 M/S
RA MAJOR: 4.26 CM
RA PRESSURE: 3 MMHG
RA WIDTH: 3.84 CM
RIGHT VENTRICULAR END-DIASTOLIC DIMENSION: 3.48 CM
RV TISSUE DOPPLER FREE WALL SYSTOLIC VELOCITY 1 (APICAL 4 CHAMBER VIEW): 11.67 CM/S
SINUS: 3.36 CM
STJ: 2.86 CM
TDI LATERAL: 0.09 M/S
TDI SEPTAL: 0.09 M/S
TDI: 0.09 M/S
TR MAX PG: 31 MMHG
TRICUSPID ANNULAR PLANE SYSTOLIC EXCURSION: 1.85 CM
TV REST PULMONARY ARTERY PRESSURE: 34 MMHG

## 2020-12-08 PROCEDURE — 93306 TTE W/DOPPLER COMPLETE: CPT | Mod: 26,,, | Performed by: INTERNAL MEDICINE

## 2020-12-08 PROCEDURE — 93306 TTE W/DOPPLER COMPLETE: CPT

## 2020-12-08 PROCEDURE — 93306 ECHO (CUPID ONLY): ICD-10-PCS | Mod: 26,,, | Performed by: INTERNAL MEDICINE

## 2020-12-09 ENCOUNTER — TELEPHONE (OUTPATIENT)
Dept: CARDIOLOGY | Facility: CLINIC | Age: 80
End: 2020-12-09

## 2020-12-09 NOTE — TELEPHONE ENCOUNTER
Left message on patient's VM to reschedule visit with Dr. Everett.  Patient left yesterday without being seen.  She stated that she had waited too long and was leaving.  Offered to do Virtual Visit next week at patient's convenience.

## 2023-07-19 NOTE — SUBJECTIVE & OBJECTIVE
Past Medical History:   Diagnosis Date    Bilateral carotid artery stenosis 10/6/2020    Essential hypertension 10/6/2020    Severe mitral regurgitation 10/6/2020       Past Surgical History:   Procedure Laterality Date    ANGIOGRAM, CORONARY, WITH LEFT HEART CATHETERIZATION  09/02/2020    APPENDECTOMY      TONSILLECTOMY         Review of patient's allergies indicates:  No Known Allergies    No current facility-administered medications on file prior to encounter.      Current Outpatient Medications on File Prior to Encounter   Medication Sig    amLODIPine (NORVASC) 5 MG tablet Take 5 mg by mouth once daily.    aspirin (ECOTRIN) 81 MG EC tablet Take 81 mg by mouth once daily.    carvediloL (COREG) 6.25 MG tablet Take 6.25 mg by mouth.    cyclobenzaprine (FLEXERIL) 10 MG tablet Take 10 mg by mouth 3 (three) times daily as needed for Muscle spasms.    hydroCHLOROthiazide (HYDRODIURIL) 25 MG tablet Take 12.5 mg by mouth once daily.     losartan (COZAAR) 25 MG tablet Take 25 mg by mouth once daily.    nitroGLYCERIN (NITROSTAT) 0.4 MG SL tablet DIS FLORENCE . 5 MIN APART . MAX 3 TS IN 15 MIN    omeprazole (PRILOSEC) 20 MG capsule Take 20 mg by mouth once daily.     Family History     Problem Relation (Age of Onset)    Brain cancer Brother    COPD Mother        Tobacco Use    Smoking status: Never Smoker    Smokeless tobacco: Never Used   Substance and Sexual Activity    Alcohol use: Never     Frequency: Never    Drug use: Never    Sexual activity: Not on file     Review of Systems   All other systems reviewed and are negative.    Objective:     Vital Signs (Most Recent):    Vital Signs (24h Range):  Pulse:  [81] 81  SpO2:  [95 %] 95 %  BP: (140-146)/(80-81) 146/81        There is no height or weight on file to calculate BMI.            No intake or output data in the 24 hours ending 11/06/20 0641    Lines/Drains/Airways     None                 Physical Exam   Constitutional: She is oriented to person,  place, and time. She appears well-developed and well-nourished. No distress.   HENT:   Head: Normocephalic and atraumatic.   Eyes: Pupils are equal, round, and reactive to light. EOM are normal.   Neck: Normal range of motion. No JVD present.   Cardiovascular: Normal rate, regular rhythm and intact distal pulses. Exam reveals no gallop and no friction rub.   Murmur (Grade III/VI holosystolic murmur, loudest at the apex) heard.  Pulmonary/Chest: Effort normal and breath sounds normal. No respiratory distress. She has no wheezes. She has no rales.   Abdominal: Soft. Bowel sounds are normal. She exhibits no distension. There is no abdominal tenderness.   Musculoskeletal: Normal range of motion.         General: No edema.   Neurological: She is alert and oriented to person, place, and time.   Skin: Skin is warm. No rash noted. She is not diaphoretic.   Psychiatric: She has a normal mood and affect. Her behavior is normal.       Significant Labs:     Recent Results (from the past 24 hour(s))   COVID-19 Routine Screening    Collection Time: 11/05/20 11:40 AM   Result Value Ref Range    SARS-CoV2 (COVID-19) Qualitative PCR Not Detected Not Detected   Albumin    Collection Time: 11/05/20 12:03 PM   Result Value Ref Range    Albumin 4.0 3.5 - 5.2 g/dL   APTT    Collection Time: 11/05/20 12:03 PM   Result Value Ref Range    aPTT 27.8 21.0 - 32.0 sec   CBC auto differential    Collection Time: 11/05/20 12:03 PM   Result Value Ref Range    WBC 6.22 3.90 - 12.70 K/uL    RBC 3.94 (L) 4.00 - 5.40 M/uL    Hemoglobin 11.6 (L) 12.0 - 16.0 g/dL    Hematocrit 36.5 (L) 37.0 - 48.5 %    MCV 93 82 - 98 fL    MCH 29.4 27.0 - 31.0 pg    MCHC 31.8 (L) 32.0 - 36.0 g/dL    RDW 15.1 (H) 11.5 - 14.5 %    Platelets 218 150 - 350 K/uL    MPV 10.8 9.2 - 12.9 fL    Immature Granulocytes 0.3 0.0 - 0.5 %    Gran # (ANC) 4.0 1.8 - 7.7 K/uL    Immature Grans (Abs) 0.02 0.00 - 0.04 K/uL    Lymph # 1.5 1.0 - 4.8 K/uL    Mono # 0.5 0.3 - 1.0 K/uL    Eos #  0.2 0.0 - 0.5 K/uL    Baso # 0.08 0.00 - 0.20 K/uL    nRBC 0 0 /100 WBC    Gran % 64.4 38.0 - 73.0 %    Lymph % 24.0 18.0 - 48.0 %    Mono % 7.4 4.0 - 15.0 %    Eosinophil % 2.6 0.0 - 8.0 %    Basophil % 1.3 0.0 - 1.9 %    Differential Method Automated    Basic Metabolic Panel    Collection Time: 11/05/20 12:03 PM   Result Value Ref Range    Sodium 143 136 - 145 mmol/L    Potassium 3.6 3.5 - 5.1 mmol/L    Chloride 109 95 - 110 mmol/L    CO2 24 23 - 29 mmol/L    Glucose 100 70 - 110 mg/dL    BUN 16 8 - 23 mg/dL    Creatinine 0.8 0.5 - 1.4 mg/dL    Calcium 8.7 8.7 - 10.5 mg/dL    Anion Gap 10 8 - 16 mmol/L    eGFR if African American >60.0 >60 mL/min/1.73 m^2    eGFR if non African American >60.0 >60 mL/min/1.73 m^2   Protime-INR    Collection Time: 11/05/20 12:03 PM   Result Value Ref Range    Prothrombin Time 12.6 (H) 9.0 - 12.5 sec    INR 1.1 0.8 - 1.2         Significant Imaging:     I have reviewed all pertinent imaging studies     Sotyktu Pregnancy And Lactation Text: There is insufficient data to evaluate whether or not Sotyktu is safe to use during pregnancy.? ?It is not known if Sotyktu passes into breast milk and whether or not it is safe to use when breastfeeding.??

## 2024-01-01 ENCOUNTER — HOSPITAL ENCOUNTER (INPATIENT)
Facility: HOSPITAL | Age: 84
LOS: 1 days | DRG: 871 | End: 2024-04-05
Attending: EMERGENCY MEDICINE | Admitting: INTERNAL MEDICINE
Payer: MEDICARE

## 2024-01-01 ENCOUNTER — ANESTHESIA (OUTPATIENT)
Dept: ANESTHESIOLOGY | Facility: HOSPITAL | Age: 84
DRG: 871 | End: 2024-01-01
Payer: MEDICARE

## 2024-01-01 ENCOUNTER — ANESTHESIA EVENT (OUTPATIENT)
Dept: ANESTHESIOLOGY | Facility: HOSPITAL | Age: 84
DRG: 871 | End: 2024-01-01
Payer: MEDICARE

## 2024-01-01 VITALS
TEMPERATURE: 101 F | HEIGHT: 69 IN | BODY MASS INDEX: 27.43 KG/M2 | DIASTOLIC BLOOD PRESSURE: 62 MMHG | WEIGHT: 185.19 LBS | SYSTOLIC BLOOD PRESSURE: 97 MMHG | OXYGEN SATURATION: 86 % | RESPIRATION RATE: 7 BRPM

## 2024-01-01 DIAGNOSIS — R65.21 SEPTIC SHOCK: ICD-10-CM

## 2024-01-01 DIAGNOSIS — R06.02 SHORTNESS OF BREATH: ICD-10-CM

## 2024-01-01 DIAGNOSIS — A41.9 SEPTIC SHOCK: ICD-10-CM

## 2024-01-01 DIAGNOSIS — R06.02 SOB (SHORTNESS OF BREATH): ICD-10-CM

## 2024-01-01 DIAGNOSIS — R00.0 TACHYCARDIA: ICD-10-CM

## 2024-01-01 LAB
ADENOVIRUS: NOT DETECTED
ALBUMIN SERPL BCP-MCNC: 2.3 G/DL (ref 3.5–5.2)
ALBUMIN SERPL BCP-MCNC: 2.6 G/DL (ref 3.5–5.2)
ALBUMIN SERPL BCP-MCNC: 3.6 G/DL (ref 3.5–5.2)
ALLENS TEST: ABNORMAL
ALP SERPL-CCNC: 44 U/L (ref 55–135)
ALP SERPL-CCNC: 44 U/L (ref 55–135)
ALP SERPL-CCNC: 65 U/L (ref 55–135)
ALT SERPL W/O P-5'-P-CCNC: 19 U/L (ref 10–44)
ALT SERPL W/O P-5'-P-CCNC: 20 U/L (ref 10–44)
ALT SERPL W/O P-5'-P-CCNC: 24 U/L (ref 10–44)
ANION GAP SERPL CALC-SCNC: 11 MMOL/L (ref 8–16)
ANION GAP SERPL CALC-SCNC: 11 MMOL/L (ref 8–16)
ANION GAP SERPL CALC-SCNC: 14 MMOL/L (ref 8–16)
ANISOCYTOSIS BLD QL SMEAR: ABNORMAL
ANISOCYTOSIS BLD QL SMEAR: SLIGHT
APTT PPP: 25.1 SEC (ref 21–32)
ASCENDING AORTA: 3.31 CM
AST SERPL-CCNC: 34 U/L (ref 10–40)
AST SERPL-CCNC: 35 U/L (ref 10–40)
AST SERPL-CCNC: 49 U/L (ref 10–40)
AV INDEX (PROSTH): 0.7
AV MEAN GRADIENT: 9 MMHG
AV PEAK GRADIENT: 13 MMHG
AV VALVE AREA BY VELOCITY RATIO: 2.1 CM²
AV VALVE AREA: 2.36 CM²
AV VELOCITY RATIO: 0.63
BACTERIA #/AREA URNS AUTO: ABNORMAL /HPF
BASO STIPL BLD QL SMEAR: ABNORMAL
BASOPHILS NFR BLD: 0 % (ref 0–1.9)
BASOPHILS NFR BLD: 1 % (ref 0–1.9)
BILIRUB SERPL-MCNC: 2.3 MG/DL (ref 0.1–1)
BILIRUB SERPL-MCNC: 2.8 MG/DL (ref 0.1–1)
BILIRUB SERPL-MCNC: 4.4 MG/DL (ref 0.1–1)
BILIRUB UR QL STRIP: NEGATIVE
BNP SERPL-MCNC: 901 PG/ML (ref 0–99)
BORDETELLA PARAPERTUSSIS (IS1001): NOT DETECTED
BORDETELLA PERTUSSIS (PTXP): NOT DETECTED
BSA FOR ECHO PROCEDURE: 2.02 M2
BUN SERPL-MCNC: 28 MG/DL (ref 8–23)
BUN SERPL-MCNC: 30 MG/DL (ref 8–23)
BUN SERPL-MCNC: 35 MG/DL (ref 8–23)
CALCIUM SERPL-MCNC: 6.7 MG/DL (ref 8.7–10.5)
CALCIUM SERPL-MCNC: 7.1 MG/DL (ref 8.7–10.5)
CALCIUM SERPL-MCNC: 8.3 MG/DL (ref 8.7–10.5)
CHLAMYDIA PNEUMONIAE: NOT DETECTED
CHLORIDE SERPL-SCNC: 100 MMOL/L (ref 95–110)
CHLORIDE SERPL-SCNC: 106 MMOL/L (ref 95–110)
CHLORIDE SERPL-SCNC: 107 MMOL/L (ref 95–110)
CLARITY UR REFRACT.AUTO: ABNORMAL
CO2 SERPL-SCNC: 17 MMOL/L (ref 23–29)
CO2 SERPL-SCNC: 18 MMOL/L (ref 23–29)
CO2 SERPL-SCNC: 19 MMOL/L (ref 23–29)
COLOR UR AUTO: ABNORMAL
CORONAVIRUS 229E, COMMON COLD VIRUS: NOT DETECTED
CORONAVIRUS HKU1, COMMON COLD VIRUS: NOT DETECTED
CORONAVIRUS NL63, COMMON COLD VIRUS: NOT DETECTED
CORONAVIRUS OC43, COMMON COLD VIRUS: NOT DETECTED
CREAT SERPL-MCNC: 1.2 MG/DL (ref 0.5–1.4)
CREAT SERPL-MCNC: 1.7 MG/DL (ref 0.5–1.4)
CREAT SERPL-MCNC: 2 MG/DL (ref 0.5–1.4)
CV ECHO LV RWT: 0.35 CM
D DIMER PPP IA.FEU-MCNC: >33 MG/L FEU
DACRYOCYTES BLD QL SMEAR: ABNORMAL
DACRYOCYTES BLD QL SMEAR: ABNORMAL
DELSYS: ABNORMAL
DIFFERENTIAL METHOD BLD: ABNORMAL
DIFFERENTIAL METHOD BLD: ABNORMAL
DOHLE BOD BLD QL SMEAR: PRESENT
DOP CALC AO PEAK VEL: 1.79 M/S
DOP CALC AO VTI: 23.36 CM
DOP CALC LVOT AREA: 3.4 CM2
DOP CALC LVOT DIAMETER: 2.07 CM
DOP CALC LVOT PEAK VEL: 1.12 M/S
DOP CALC LVOT STROKE VOLUME: 55.16 CM3
DOP CALC MV VTI: 30.26 CM
DOP CALCLVOT PEAK VEL VTI: 16.4 CM
ECHO LV POSTERIOR WALL: 0.87 CM (ref 0.6–1.1)
EJECTION FRACTION: 45 %
EOSINOPHIL NFR BLD: 0 % (ref 0–8)
EOSINOPHIL NFR BLD: 0 % (ref 0–8)
ERYTHROCYTE [DISTWIDTH] IN BLOOD BY AUTOMATED COUNT: 21.2 % (ref 11.5–14.5)
ERYTHROCYTE [DISTWIDTH] IN BLOOD BY AUTOMATED COUNT: 21.2 % (ref 11.5–14.5)
ERYTHROCYTE [SEDIMENTATION RATE] IN BLOOD BY WESTERGREN METHOD: 28 MM/H
ERYTHROCYTE [SEDIMENTATION RATE] IN BLOOD BY WESTERGREN METHOD: 35 MM/H
EST. GFR  (NO RACE VARIABLE): 24.5 ML/MIN/1.73 M^2
EST. GFR  (NO RACE VARIABLE): 29.8 ML/MIN/1.73 M^2
EST. GFR  (NO RACE VARIABLE): 45.2 ML/MIN/1.73 M^2
FIO2: 100
FIO2: 100
FLOW: 10
FLOW: 50
FLUBV RNA NPH QL NAA+NON-PROBE: NOT DETECTED
FRACTIONAL SHORTENING: 15 % (ref 28–44)
GIANT PLATELETS BLD QL SMEAR: PRESENT
GIANT PLATELETS BLD QL SMEAR: PRESENT
GLUCOSE SERPL-MCNC: 104 MG/DL (ref 70–110)
GLUCOSE SERPL-MCNC: 117 MG/DL (ref 70–110)
GLUCOSE SERPL-MCNC: 217 MG/DL (ref 70–110)
GLUCOSE UR QL STRIP: ABNORMAL
HCO3 UR-SCNC: 19.9 MMOL/L (ref 24–28)
HCO3 UR-SCNC: 21.5 MMOL/L (ref 24–28)
HCO3 UR-SCNC: 24 MMOL/L (ref 24–28)
HCO3 UR-SCNC: 8.9 MMOL/L (ref 24–28)
HCT VFR BLD AUTO: 25.7 % (ref 37–48.5)
HCT VFR BLD AUTO: 28.8 % (ref 37–48.5)
HCT VFR BLD CALC: 21 %PCV (ref 36–54)
HCV AB SERPL QL IA: NORMAL
HGB BLD-MCNC: 8.1 G/DL (ref 12–16)
HGB BLD-MCNC: 9.1 G/DL (ref 12–16)
HGB UR QL STRIP: NEGATIVE
HIV 1+2 AB+HIV1 P24 AG SERPL QL IA: NORMAL
HPIV1 RNA NPH QL NAA+NON-PROBE: NOT DETECTED
HPIV2 RNA NPH QL NAA+NON-PROBE: NOT DETECTED
HPIV3 RNA NPH QL NAA+NON-PROBE: NOT DETECTED
HPIV4 RNA NPH QL NAA+NON-PROBE: NOT DETECTED
HR MV ECHO: 125 BPM
HUMAN METAPNEUMOVIRUS: NOT DETECTED
HYALINE CASTS UR QL AUTO: 0 /LPF
HYPOCHROMIA BLD QL SMEAR: ABNORMAL
IMM GRANULOCYTES # BLD AUTO: ABNORMAL K/UL (ref 0–0.04)
IMM GRANULOCYTES # BLD AUTO: ABNORMAL K/UL (ref 0–0.04)
IMM GRANULOCYTES NFR BLD AUTO: ABNORMAL % (ref 0–0.5)
IMM GRANULOCYTES NFR BLD AUTO: ABNORMAL % (ref 0–0.5)
INFLUENZA A (SUBTYPES H1,H1-2009,H3): NOT DETECTED
INR PPP: 1.7 (ref 0.8–1.2)
INR PPP: 2.1 (ref 0.8–1.2)
INTERVENTRICULAR SEPTUM: 1.1 CM (ref 0.6–1.1)
KETONES UR QL STRIP: NEGATIVE
LA MAJOR: 6.85 CM
LA MINOR: 7.01 CM
LA WIDTH: 4.99 CM
LACTATE SERPL-SCNC: 6.4 MMOL/L (ref 0.5–2.2)
LDH SERPL L TO P-CCNC: 14.1 MMOL/L (ref 0.5–2.2)
LDH SERPL L TO P-CCNC: 5.77 MMOL/L (ref 0.5–2.2)
LEFT ATRIUM SIZE: 3.16 CM
LEFT ATRIUM VOLUME INDEX MOD: 53.7 ML/M2
LEFT ATRIUM VOLUME INDEX: 46.4 ML/M2
LEFT ATRIUM VOLUME MOD: 107.35 CM3
LEFT ATRIUM VOLUME: 92.87 CM3
LEFT INTERNAL DIMENSION IN SYSTOLE: 4.21 CM (ref 2.1–4)
LEFT VENTRICLE DIASTOLIC VOLUME INDEX: 58.22 ML/M2
LEFT VENTRICLE DIASTOLIC VOLUME: 116.43 ML
LEFT VENTRICLE MASS INDEX: 88 G/M2
LEFT VENTRICLE SYSTOLIC VOLUME INDEX: 39.5 ML/M2
LEFT VENTRICLE SYSTOLIC VOLUME: 78.96 ML
LEFT VENTRICULAR INTERNAL DIMENSION IN DIASTOLE: 4.97 CM (ref 3.5–6)
LEFT VENTRICULAR MASS: 176.56 G
LEUKOCYTE ESTERASE UR QL STRIP: NEGATIVE
LV LATERAL E/E' RATIO: 49 M/S
LYMPHOCYTES NFR BLD: 50 % (ref 18–48)
LYMPHOCYTES NFR BLD: 57 % (ref 18–48)
MAGNESIUM SERPL-MCNC: 1.6 MG/DL (ref 1.6–2.6)
MCH RBC QN AUTO: 35.5 PG (ref 27–31)
MCH RBC QN AUTO: 36.3 PG (ref 27–31)
MCHC RBC AUTO-ENTMCNC: 31.5 G/DL (ref 32–36)
MCHC RBC AUTO-ENTMCNC: 31.6 G/DL (ref 32–36)
MCV RBC AUTO: 113 FL (ref 82–98)
MCV RBC AUTO: 115 FL (ref 82–98)
METAMYELOCYTES NFR BLD MANUAL: 1 %
MICROSCOPIC COMMENT: ABNORMAL
MODE: ABNORMAL
MONOCYTES NFR BLD: 11 % (ref 4–15)
MONOCYTES NFR BLD: 30 % (ref 4–15)
MV MEAN GRADIENT: 7 MMHG
MV PEAK E VEL: 1.47 M/S
MV PEAK GRADIENT: 13 MMHG
MV VALVE AREA BY CONTINUITY EQUATION: 1.82 CM2
MYCOPLASMA PNEUMONIAE: NOT DETECTED
MYELOCYTES NFR BLD MANUAL: 1 %
NEUTROPHILS NFR BLD: 12 % (ref 38–73)
NEUTROPHILS NFR BLD: 8 % (ref 38–73)
NITRITE UR QL STRIP: NEGATIVE
NRBC BLD-RTO: 17 /100 WBC
NRBC BLD-RTO: 18 /100 WBC
OHS LV EJECTION FRACTION SIMPSONS BIPLANE MOD: 45 %
OHS QRS DURATION: 92 MS
OHS QTC CALCULATION: 437 MS
OVALOCYTES BLD QL SMEAR: ABNORMAL
OVALOCYTES BLD QL SMEAR: ABNORMAL
PCO2 BLDA: 26.5 MMHG (ref 35–45)
PCO2 BLDA: 48.7 MMHG (ref 35–45)
PCO2 BLDA: 55.8 MMHG (ref 35–45)
PCO2 BLDA: 57.1 MMHG (ref 35–45)
PEEP: 5
PH SMN: 7.13 [PH] (ref 7.35–7.45)
PH SMN: 7.19 [PH] (ref 7.35–7.45)
PH SMN: 7.22 [PH] (ref 7.35–7.45)
PH SMN: 7.23 [PH] (ref 7.35–7.45)
PH UR STRIP: 5 [PH] (ref 5–8)
PHOSPHATE SERPL-MCNC: 3 MG/DL (ref 2.7–4.5)
PISA MRMAX VEL: 0.05 M/S
PISA TR MAX VEL: 3.74 M/S
PLATELET # BLD AUTO: 100 K/UL (ref 150–450)
PLATELET # BLD AUTO: 69 K/UL (ref 150–450)
PLATELET BLD QL SMEAR: ABNORMAL
PMV BLD AUTO: 10.9 FL (ref 9.2–12.9)
PMV BLD AUTO: 11.4 FL (ref 9.2–12.9)
PO2 BLDA: 146 MMHG (ref 80–100)
PO2 BLDA: 24 MMHG (ref 40–60)
PO2 BLDA: 34 MMHG (ref 40–60)
PO2 BLDA: 86 MMHG (ref 80–100)
POC BE: -20 MMOL/L
POC BE: -4 MMOL/L
POC BE: -7 MMOL/L
POC BE: -8 MMOL/L
POC IONIZED CALCIUM: 1.09 MMOL/L (ref 1.06–1.42)
POC SATURATED O2: 31 % (ref 95–100)
POC SATURATED O2: 50 % (ref 95–100)
POC SATURATED O2: 94 % (ref 95–100)
POC SATURATED O2: 99 % (ref 95–100)
POC TCO2: 10 MMOL/L (ref 24–29)
POC TCO2: 21 MMOL/L (ref 24–29)
POC TCO2: 23 MMOL/L (ref 23–27)
POC TCO2: 26 MMOL/L (ref 23–27)
POIKILOCYTOSIS BLD QL SMEAR: SLIGHT
POIKILOCYTOSIS BLD QL SMEAR: SLIGHT
POLYCHROMASIA BLD QL SMEAR: ABNORMAL
POLYCHROMASIA BLD QL SMEAR: ABNORMAL
POTASSIUM BLD-SCNC: 3.4 MMOL/L (ref 3.5–5.1)
POTASSIUM SERPL-SCNC: 3.2 MMOL/L (ref 3.5–5.1)
POTASSIUM SERPL-SCNC: 3.2 MMOL/L (ref 3.5–5.1)
POTASSIUM SERPL-SCNC: 4.5 MMOL/L (ref 3.5–5.1)
PROCALCITONIN SERPL IA-MCNC: 15.68 NG/ML
PROT SERPL-MCNC: 4.4 G/DL (ref 6–8.4)
PROT SERPL-MCNC: 4.6 G/DL (ref 6–8.4)
PROT SERPL-MCNC: 6.4 G/DL (ref 6–8.4)
PROT UR QL STRIP: ABNORMAL
PROTHROMBIN TIME: 17.3 SEC (ref 9–12.5)
PROTHROMBIN TIME: 21.8 SEC (ref 9–12.5)
RA MAJOR: 4.78 CM
RA WIDTH: 3.59 CM
RBC # BLD AUTO: 2.28 M/UL (ref 4–5.4)
RBC # BLD AUTO: 2.51 M/UL (ref 4–5.4)
RBC #/AREA URNS AUTO: 5 /HPF (ref 0–4)
RESPIRATORY INFECTION PANEL SOURCE: ABNORMAL
RIGHT VENTRICULAR END-DIASTOLIC DIMENSION: 2.4 CM
RSV RNA NPH QL NAA+NON-PROBE: NOT DETECTED
RV+EV RNA NPH QL NAA+NON-PROBE: DETECTED
SAMPLE: ABNORMAL
SARS-COV-2 RNA RESP QL NAA+PROBE: NOT DETECTED
SCHISTOCYTES BLD QL SMEAR: ABNORMAL
SINUS: 3.56 CM
SITE: ABNORMAL
SMUDGE CELLS BLD QL SMEAR: PRESENT
SODIUM BLD-SCNC: 139 MMOL/L (ref 136–145)
SODIUM SERPL-SCNC: 128 MMOL/L (ref 136–145)
SODIUM SERPL-SCNC: 136 MMOL/L (ref 136–145)
SODIUM SERPL-SCNC: 139 MMOL/L (ref 136–145)
SP GR UR STRIP: 1.03 (ref 1–1.03)
SP02: 95
SPHEROCYTES BLD QL SMEAR: ABNORMAL
SQUAMOUS #/AREA URNS AUTO: 0 /HPF
STJ: 3.06 CM
TASV: 22 CM/S
TDI LATERAL: 0.03 M/S
TOXIC GRANULES BLD QL SMEAR: PRESENT
TR MAX PG: 56 MMHG
TRICUSPID ANNULAR PLANE SYSTOLIC EXCURSION: 1.53 CM
TROPONIN I SERPL DL<=0.01 NG/ML-MCNC: 0.03 NG/ML (ref 0–0.03)
TROPONIN I SERPL DL<=0.01 NG/ML-MCNC: 0.09 NG/ML (ref 0–0.03)
URN SPEC COLLECT METH UR: ABNORMAL
VT: 390
WBC # BLD AUTO: 6.37 K/UL (ref 3.9–12.7)
WBC # BLD AUTO: 6.94 K/UL (ref 3.9–12.7)
WBC #/AREA URNS AUTO: 5 /HPF (ref 0–5)
WBC OTHER NFR BLD MANUAL: 29 %
WBC TOXIC VACUOLES BLD QL SMEAR: PRESENT
YEAST UR QL AUTO: ABNORMAL
Z-SCORE OF LEFT VENTRICULAR DIMENSION IN END DIASTOLE: -1.6
Z-SCORE OF LEFT VENTRICULAR DIMENSION IN END SYSTOLE: 1.28

## 2024-01-01 PROCEDURE — 85027 COMPLETE CBC AUTOMATED: CPT | Performed by: NURSE PRACTITIONER

## 2024-01-01 PROCEDURE — 80053 COMPREHEN METABOLIC PANEL: CPT | Performed by: EMERGENCY MEDICINE

## 2024-01-01 PROCEDURE — 27100092 HC HIGH FLOW DELIVERY CANNULA

## 2024-01-01 PROCEDURE — 83605 ASSAY OF LACTIC ACID: CPT

## 2024-01-01 PROCEDURE — 25000003 PHARM REV CODE 250: Performed by: STUDENT IN AN ORGANIZED HEALTH CARE EDUCATION/TRAINING PROGRAM

## 2024-01-01 PROCEDURE — 63600175 PHARM REV CODE 636 W HCPCS

## 2024-01-01 PROCEDURE — 31500 INSERT EMERGENCY AIRWAY: CPT

## 2024-01-01 PROCEDURE — 5A0935A ASSISTANCE WITH RESPIRATORY VENTILATION, LESS THAN 24 CONSECUTIVE HOURS, HIGH NASAL FLOW/VELOCITY: ICD-10-PCS | Performed by: EMERGENCY MEDICINE

## 2024-01-01 PROCEDURE — 99900035 HC TECH TIME PER 15 MIN (STAT)

## 2024-01-01 PROCEDURE — 80053 COMPREHEN METABOLIC PANEL: CPT | Performed by: NURSE PRACTITIONER

## 2024-01-01 PROCEDURE — 85014 HEMATOCRIT: CPT

## 2024-01-01 PROCEDURE — 51702 INSERT TEMP BLADDER CATH: CPT

## 2024-01-01 PROCEDURE — 99285 EMERGENCY DEPT VISIT HI MDM: CPT | Mod: 25

## 2024-01-01 PROCEDURE — 37799 UNLISTED PX VASCULAR SURGERY: CPT

## 2024-01-01 PROCEDURE — 83880 ASSAY OF NATRIURETIC PEPTIDE: CPT | Performed by: EMERGENCY MEDICINE

## 2024-01-01 PROCEDURE — 36620 INSERTION CATHETER ARTERY: CPT

## 2024-01-01 PROCEDURE — 25000003 PHARM REV CODE 250

## 2024-01-01 PROCEDURE — 85379 FIBRIN DEGRADATION QUANT: CPT | Performed by: EMERGENCY MEDICINE

## 2024-01-01 PROCEDURE — 25000003 PHARM REV CODE 250: Performed by: NURSE PRACTITIONER

## 2024-01-01 PROCEDURE — 93010 ELECTROCARDIOGRAM REPORT: CPT | Mod: ,,, | Performed by: INTERNAL MEDICINE

## 2024-01-01 PROCEDURE — 87389 HIV-1 AG W/HIV-1&-2 AB AG IA: CPT | Performed by: EMERGENCY MEDICINE

## 2024-01-01 PROCEDURE — 96374 THER/PROPH/DIAG INJ IV PUSH: CPT | Mod: 59

## 2024-01-01 PROCEDURE — 85027 COMPLETE CBC AUTOMATED: CPT | Performed by: EMERGENCY MEDICINE

## 2024-01-01 PROCEDURE — 94002 VENT MGMT INPAT INIT DAY: CPT

## 2024-01-01 PROCEDURE — 94761 N-INVAS EAR/PLS OXIMETRY MLT: CPT | Mod: XB

## 2024-01-01 PROCEDURE — 82803 BLOOD GASES ANY COMBINATION: CPT

## 2024-01-01 PROCEDURE — 63600175 PHARM REV CODE 636 W HCPCS: Performed by: STUDENT IN AN ORGANIZED HEALTH CARE EDUCATION/TRAINING PROGRAM

## 2024-01-01 PROCEDURE — 86803 HEPATITIS C AB TEST: CPT | Performed by: EMERGENCY MEDICINE

## 2024-01-01 PROCEDURE — 63600175 PHARM REV CODE 636 W HCPCS: Performed by: EMERGENCY MEDICINE

## 2024-01-01 PROCEDURE — 85610 PROTHROMBIN TIME: CPT | Performed by: EMERGENCY MEDICINE

## 2024-01-01 PROCEDURE — 96365 THER/PROPH/DIAG IV INF INIT: CPT

## 2024-01-01 PROCEDURE — 20000000 HC ICU ROOM

## 2024-01-01 PROCEDURE — 63600175 PHARM REV CODE 636 W HCPCS: Mod: JG

## 2024-01-01 PROCEDURE — 36556 INSERT NON-TUNNEL CV CATH: CPT | Mod: ,,, | Performed by: NURSE PRACTITIONER

## 2024-01-01 PROCEDURE — 63600175 PHARM REV CODE 636 W HCPCS: Performed by: INTERNAL MEDICINE

## 2024-01-01 PROCEDURE — 87899 AGENT NOS ASSAY W/OPTIC: CPT

## 2024-01-01 PROCEDURE — 84295 ASSAY OF SERUM SODIUM: CPT

## 2024-01-01 PROCEDURE — 25500020 PHARM REV CODE 255: Performed by: EMERGENCY MEDICINE

## 2024-01-01 PROCEDURE — 96361 HYDRATE IV INFUSION ADD-ON: CPT

## 2024-01-01 PROCEDURE — 85610 PROTHROMBIN TIME: CPT

## 2024-01-01 PROCEDURE — 85007 BL SMEAR W/DIFF WBC COUNT: CPT | Performed by: EMERGENCY MEDICINE

## 2024-01-01 PROCEDURE — 27000207 HC ISOLATION

## 2024-01-01 PROCEDURE — 25000003 PHARM REV CODE 250: Performed by: EMERGENCY MEDICINE

## 2024-01-01 PROCEDURE — 25000003 PHARM REV CODE 250: Performed by: INTERNAL MEDICINE

## 2024-01-01 PROCEDURE — 83735 ASSAY OF MAGNESIUM: CPT | Performed by: NURSE PRACTITIONER

## 2024-01-01 PROCEDURE — 87486 CHLMYD PNEUM DNA AMP PROBE: CPT | Performed by: NURSE PRACTITIONER

## 2024-01-01 PROCEDURE — 27100171 HC OXYGEN HIGH FLOW UP TO 24 HOURS

## 2024-01-01 PROCEDURE — 0BH17EZ INSERTION OF ENDOTRACHEAL AIRWAY INTO TRACHEA, VIA NATURAL OR ARTIFICIAL OPENING: ICD-10-PCS | Performed by: ANESTHESIOLOGY

## 2024-01-01 PROCEDURE — 87040 BLOOD CULTURE FOR BACTERIA: CPT | Performed by: EMERGENCY MEDICINE

## 2024-01-01 PROCEDURE — 5A1935Z RESPIRATORY VENTILATION, LESS THAN 24 CONSECUTIVE HOURS: ICD-10-PCS | Performed by: ANESTHESIOLOGY

## 2024-01-01 PROCEDURE — 93005 ELECTROCARDIOGRAM TRACING: CPT

## 2024-01-01 PROCEDURE — 84484 ASSAY OF TROPONIN QUANT: CPT

## 2024-01-01 PROCEDURE — 27000249 HC VAPOTHERM CIRCUIT

## 2024-01-01 PROCEDURE — 84100 ASSAY OF PHOSPHORUS: CPT | Performed by: NURSE PRACTITIONER

## 2024-01-01 PROCEDURE — 81001 URINALYSIS AUTO W/SCOPE: CPT

## 2024-01-01 PROCEDURE — 82330 ASSAY OF CALCIUM: CPT

## 2024-01-01 PROCEDURE — 94660 CPAP INITIATION&MGMT: CPT

## 2024-01-01 PROCEDURE — 99291 CRITICAL CARE FIRST HOUR: CPT | Mod: ,,, | Performed by: INTERNAL MEDICINE

## 2024-01-01 PROCEDURE — 96375 TX/PRO/DX INJ NEW DRUG ADDON: CPT

## 2024-01-01 PROCEDURE — 27000190 HC CPAP FULL FACE MASK W/VALVE

## 2024-01-01 PROCEDURE — 02HV33Z INSERTION OF INFUSION DEVICE INTO SUPERIOR VENA CAVA, PERCUTANEOUS APPROACH: ICD-10-PCS | Performed by: INTERNAL MEDICINE

## 2024-01-01 PROCEDURE — 31500 INSERT EMERGENCY AIRWAY: CPT | Mod: GC,,, | Performed by: ANESTHESIOLOGY

## 2024-01-01 PROCEDURE — 63600175 PHARM REV CODE 636 W HCPCS: Mod: JZ,JG | Performed by: NURSE PRACTITIONER

## 2024-01-01 PROCEDURE — 87798 DETECT AGENT NOS DNA AMP: CPT | Mod: 59 | Performed by: NURSE PRACTITIONER

## 2024-01-01 PROCEDURE — 84132 ASSAY OF SERUM POTASSIUM: CPT

## 2024-01-01 PROCEDURE — 87449 NOS EACH ORGANISM AG IA: CPT

## 2024-01-01 PROCEDURE — 85007 BL SMEAR W/DIFF WBC COUNT: CPT | Performed by: NURSE PRACTITIONER

## 2024-01-01 PROCEDURE — 63600175 PHARM REV CODE 636 W HCPCS: Performed by: NURSE PRACTITIONER

## 2024-01-01 PROCEDURE — 80053 COMPREHEN METABOLIC PANEL: CPT | Mod: 91

## 2024-01-01 PROCEDURE — 85730 THROMBOPLASTIN TIME PARTIAL: CPT | Performed by: EMERGENCY MEDICINE

## 2024-01-01 PROCEDURE — C1751 CATH, INF, PER/CENT/MIDLINE: HCPCS

## 2024-01-01 PROCEDURE — 84484 ASSAY OF TROPONIN QUANT: CPT | Performed by: EMERGENCY MEDICINE

## 2024-01-01 PROCEDURE — 84145 PROCALCITONIN (PCT): CPT

## 2024-01-01 PROCEDURE — 63600175 PHARM REV CODE 636 W HCPCS: Mod: JZ,JG

## 2024-01-01 PROCEDURE — 36620 INSERTION CATHETER ARTERY: CPT | Mod: 59,,, | Performed by: NURSE PRACTITIONER

## 2024-01-01 RX ORDER — PROCHLORPERAZINE EDISYLATE 5 MG/ML
2.5 INJECTION INTRAMUSCULAR; INTRAVENOUS EVERY 6 HOURS PRN
Status: DISCONTINUED | OUTPATIENT
Start: 2024-01-01 | End: 2024-01-01 | Stop reason: HOSPADM

## 2024-01-01 RX ORDER — MAGNESIUM SULFATE HEPTAHYDRATE 40 MG/ML
2 INJECTION, SOLUTION INTRAVENOUS ONCE
Status: COMPLETED | OUTPATIENT
Start: 2024-01-01 | End: 2024-01-01

## 2024-01-01 RX ORDER — INDOMETHACIN 25 MG/1
50 CAPSULE ORAL ONCE
Status: COMPLETED | OUTPATIENT
Start: 2024-01-01 | End: 2024-01-01

## 2024-01-01 RX ORDER — MORPHINE SULFATE 2 MG/ML
2 INJECTION, SOLUTION INTRAMUSCULAR; INTRAVENOUS
Status: DISCONTINUED | OUTPATIENT
Start: 2024-01-01 | End: 2024-01-01 | Stop reason: HOSPADM

## 2024-01-01 RX ORDER — POTASSIUM CHLORIDE 7.45 MG/ML
10 INJECTION INTRAVENOUS
Status: DISCONTINUED | OUTPATIENT
Start: 2024-01-01 | End: 2024-01-01

## 2024-01-01 RX ORDER — SODIUM CHLORIDE 9 MG/ML
INJECTION, SOLUTION INTRAVENOUS CONTINUOUS
Status: DISCONTINUED | OUTPATIENT
Start: 2024-01-01 | End: 2024-01-01 | Stop reason: HOSPADM

## 2024-01-01 RX ORDER — VASOPRESSIN 20 [USP'U]/ML
INJECTION, SOLUTION INTRAMUSCULAR; SUBCUTANEOUS
Status: COMPLETED
Start: 2024-01-01 | End: 2024-01-01

## 2024-01-01 RX ORDER — NOREPINEPHRINE BITARTRATE/D5W 4MG/250ML
PLASTIC BAG, INJECTION (ML) INTRAVENOUS
Status: DISCONTINUED
Start: 2024-01-01 | End: 2024-01-01 | Stop reason: HOSPADM

## 2024-01-01 RX ORDER — NOREPINEPHRINE BITARTRATE/D5W 4MG/250ML
0-3 PLASTIC BAG, INJECTION (ML) INTRAVENOUS CONTINUOUS
Status: DISCONTINUED | OUTPATIENT
Start: 2024-01-01 | End: 2024-01-01

## 2024-01-01 RX ORDER — HEPARIN SODIUM 5000 [USP'U]/ML
5000 INJECTION, SOLUTION INTRAVENOUS; SUBCUTANEOUS EVERY 8 HOURS
Status: DISCONTINUED | OUTPATIENT
Start: 2024-01-01 | End: 2024-01-01 | Stop reason: HOSPADM

## 2024-01-01 RX ORDER — ETOMIDATE 2 MG/ML
INJECTION INTRAVENOUS
Status: DISCONTINUED
Start: 2024-01-01 | End: 2024-01-01 | Stop reason: WASHOUT

## 2024-01-01 RX ORDER — FAMOTIDINE 10 MG/ML
20 INJECTION INTRAVENOUS DAILY
Status: DISCONTINUED | OUTPATIENT
Start: 2024-01-01 | End: 2024-01-01 | Stop reason: HOSPADM

## 2024-01-01 RX ORDER — FUROSEMIDE 10 MG/ML
80 INJECTION INTRAMUSCULAR; INTRAVENOUS
Status: DISCONTINUED | OUTPATIENT
Start: 2024-01-01 | End: 2024-01-01

## 2024-01-01 RX ORDER — CALCIUM GLUCONATE 20 MG/ML
1 INJECTION, SOLUTION INTRAVENOUS
Status: COMPLETED | OUTPATIENT
Start: 2024-01-01 | End: 2024-01-01

## 2024-01-01 RX ORDER — NOREPINEPHRINE BITARTRATE/D5W 4MG/250ML
0-.2 PLASTIC BAG, INJECTION (ML) INTRAVENOUS CONTINUOUS
Status: DISCONTINUED | OUTPATIENT
Start: 2024-01-01 | End: 2024-01-01

## 2024-01-01 RX ORDER — PROPOFOL 10 MG/ML
VIAL (ML) INTRAVENOUS
Status: DISCONTINUED
Start: 2024-01-01 | End: 2024-01-01 | Stop reason: WASHOUT

## 2024-01-01 RX ORDER — PHENYLEPHRINE HYDROCHLORIDE 10 MG/ML
340 INJECTION INTRAVENOUS ONCE
Status: COMPLETED | OUTPATIENT
Start: 2024-01-01 | End: 2024-01-01

## 2024-01-01 RX ORDER — GLUCAGON 1 MG
1 KIT INJECTION
Status: DISCONTINUED | OUTPATIENT
Start: 2024-01-01 | End: 2024-01-01 | Stop reason: HOSPADM

## 2024-01-01 RX ORDER — HEPARIN SODIUM,PORCINE/D5W 25000/250
0-40 INTRAVENOUS SOLUTION INTRAVENOUS CONTINUOUS
Status: DISCONTINUED | OUTPATIENT
Start: 2024-01-01 | End: 2024-01-01

## 2024-01-01 RX ORDER — ONDANSETRON HYDROCHLORIDE 2 MG/ML
4 INJECTION, SOLUTION INTRAVENOUS EVERY 8 HOURS PRN
Status: DISCONTINUED | OUTPATIENT
Start: 2024-01-01 | End: 2024-01-01 | Stop reason: HOSPADM

## 2024-01-01 RX ORDER — LORAZEPAM 2 MG/ML
0.5 INJECTION INTRAMUSCULAR EVERY 30 MIN PRN
Status: DISCONTINUED | OUTPATIENT
Start: 2024-01-01 | End: 2024-01-01 | Stop reason: HOSPADM

## 2024-01-01 RX ORDER — MIDAZOLAM HYDROCHLORIDE 1 MG/ML
INJECTION, SOLUTION INTRAMUSCULAR; INTRAVENOUS
Status: COMPLETED
Start: 2024-01-01 | End: 2024-01-01

## 2024-01-01 RX ORDER — ROCURONIUM BROMIDE 10 MG/ML
100 INJECTION, SOLUTION INTRAVENOUS ONCE
Status: COMPLETED | OUTPATIENT
Start: 2024-01-01 | End: 2024-01-01

## 2024-01-01 RX ORDER — SODIUM CHLORIDE 0.9 % (FLUSH) 0.9 %
10 SYRINGE (ML) INJECTION
Status: DISCONTINUED | OUTPATIENT
Start: 2024-01-01 | End: 2024-01-01 | Stop reason: HOSPADM

## 2024-01-01 RX ORDER — POTASSIUM CHLORIDE 29.8 MG/ML
40 INJECTION INTRAVENOUS ONCE
Status: COMPLETED | OUTPATIENT
Start: 2024-01-01 | End: 2024-01-01

## 2024-01-01 RX ORDER — METRONIDAZOLE 500 MG/100ML
500 INJECTION, SOLUTION INTRAVENOUS
Status: DISCONTINUED | OUTPATIENT
Start: 2024-01-01 | End: 2024-01-01 | Stop reason: HOSPADM

## 2024-01-01 RX ORDER — ROCURONIUM BROMIDE 10 MG/ML
INJECTION, SOLUTION INTRAVENOUS
Status: COMPLETED
Start: 2024-01-01 | End: 2024-01-01

## 2024-01-01 RX ORDER — FLUDROCORTISONE ACETATE 0.1 MG/1
100 TABLET ORAL DAILY
Status: DISCONTINUED | OUTPATIENT
Start: 2024-01-01 | End: 2024-01-01 | Stop reason: HOSPADM

## 2024-01-01 RX ORDER — FENTANYL CITRATE-0.9 % NACL/PF 10 MCG/ML
0-250 PLASTIC BAG, INJECTION (ML) INTRAVENOUS CONTINUOUS
Status: DISCONTINUED | OUTPATIENT
Start: 2024-01-01 | End: 2024-01-01 | Stop reason: HOSPADM

## 2024-01-01 RX ORDER — PHENYLEPHRINE HCL IN 0.9% NACL 1 MG/10 ML
SYRINGE (ML) INTRAVENOUS
Status: DISCONTINUED
Start: 2024-01-01 | End: 2024-01-01 | Stop reason: WASHOUT

## 2024-01-01 RX ORDER — IBUPROFEN 200 MG
24 TABLET ORAL
Status: DISCONTINUED | OUTPATIENT
Start: 2024-01-01 | End: 2024-01-01 | Stop reason: HOSPADM

## 2024-01-01 RX ORDER — INSULIN ASPART 100 [IU]/ML
0-10 INJECTION, SOLUTION INTRAVENOUS; SUBCUTANEOUS
Status: DISCONTINUED | OUTPATIENT
Start: 2024-01-01 | End: 2024-01-01 | Stop reason: HOSPADM

## 2024-01-01 RX ORDER — CHLORHEXIDINE GLUCONATE ORAL RINSE 1.2 MG/ML
15 SOLUTION DENTAL 2 TIMES DAILY
Status: DISCONTINUED | OUTPATIENT
Start: 2024-01-01 | End: 2024-01-01

## 2024-01-01 RX ORDER — NOREPINEPHRINE BITARTRATE/D5W 4MG/250ML
PLASTIC BAG, INJECTION (ML) INTRAVENOUS
Status: DISPENSED
Start: 2024-01-01 | End: 2024-01-01

## 2024-01-01 RX ORDER — IBUPROFEN 200 MG
16 TABLET ORAL
Status: DISCONTINUED | OUTPATIENT
Start: 2024-01-01 | End: 2024-01-01 | Stop reason: HOSPADM

## 2024-01-01 RX ORDER — DEXMEDETOMIDINE HYDROCHLORIDE 4 UG/ML
INJECTION, SOLUTION INTRAVENOUS
Status: COMPLETED
Start: 2024-01-01 | End: 2024-01-01

## 2024-01-01 RX ORDER — SUCCINYLCHOLINE CHLORIDE 20 MG/ML
INJECTION INTRAMUSCULAR; INTRAVENOUS
Status: DISCONTINUED
Start: 2024-01-01 | End: 2024-01-01 | Stop reason: WASHOUT

## 2024-01-01 RX ORDER — MUPIROCIN 20 MG/G
OINTMENT TOPICAL 2 TIMES DAILY
Status: DISCONTINUED | OUTPATIENT
Start: 2024-01-01 | End: 2024-01-01 | Stop reason: HOSPADM

## 2024-01-01 RX ORDER — DEXMEDETOMIDINE HYDROCHLORIDE 4 UG/ML
0-1.4 INJECTION, SOLUTION INTRAVENOUS CONTINUOUS
Status: DISCONTINUED | OUTPATIENT
Start: 2024-01-01 | End: 2024-01-01 | Stop reason: HOSPADM

## 2024-01-01 RX ORDER — MIDAZOLAM HYDROCHLORIDE 1 MG/ML
2 INJECTION, SOLUTION INTRAMUSCULAR; INTRAVENOUS ONCE
Status: COMPLETED | OUTPATIENT
Start: 2024-01-01 | End: 2024-01-01

## 2024-01-01 RX ORDER — VASOPRESSIN 20 [USP'U]/ML
12 INJECTION, SOLUTION INTRAMUSCULAR; SUBCUTANEOUS ONCE
Status: COMPLETED | OUTPATIENT
Start: 2024-01-01 | End: 2024-01-01

## 2024-01-01 RX ORDER — MORPHINE SULFATE 2 MG/ML
INJECTION, SOLUTION INTRAMUSCULAR; INTRAVENOUS
Status: COMPLETED
Start: 2024-01-01 | End: 2024-01-01

## 2024-01-01 RX ORDER — INDOMETHACIN 25 MG/1
CAPSULE ORAL
Status: COMPLETED
Start: 2024-01-01 | End: 2024-01-01

## 2024-01-01 RX ORDER — ACETAMINOPHEN 325 MG/1
650 TABLET ORAL EVERY 4 HOURS PRN
Status: DISCONTINUED | OUTPATIENT
Start: 2024-01-01 | End: 2024-01-01 | Stop reason: HOSPADM

## 2024-01-01 RX ADMIN — HEPARIN SODIUM AND DEXTROSE 18 UNITS/KG/HR: 10000; 5 INJECTION INTRAVENOUS at 12:04

## 2024-01-01 RX ADMIN — HYDROCORTISONE SODIUM SUCCINATE 100 MG: 100 INJECTION, POWDER, FOR SOLUTION INTRAMUSCULAR; INTRAVENOUS at 01:04

## 2024-01-01 RX ADMIN — Medication 25 MCG/HR: at 10:04

## 2024-01-01 RX ADMIN — VASOPRESSIN 12 UNITS: 20 INJECTION INTRAVENOUS at 07:04

## 2024-01-01 RX ADMIN — CALCIUM GLUCONATE 1 G: 20 INJECTION, SOLUTION INTRAVENOUS at 12:04

## 2024-01-01 RX ADMIN — VASOPRESSIN 0.04 UNITS/MIN: 20 INJECTION INTRAVENOUS at 05:04

## 2024-01-01 RX ADMIN — NOREPINEPHRINE BITARTRATE 0.9 MCG/KG/MIN: 4 INJECTION, SOLUTION INTRAVENOUS at 07:04

## 2024-01-01 RX ADMIN — CEFEPIME 1 G: 1 INJECTION, POWDER, FOR SOLUTION INTRAMUSCULAR; INTRAVENOUS at 10:04

## 2024-01-01 RX ADMIN — SODIUM BICARBONATE 50 MEQ: 84 INJECTION, SOLUTION INTRAVENOUS at 01:04

## 2024-01-01 RX ADMIN — SODIUM CHLORIDE, POTASSIUM CHLORIDE, SODIUM LACTATE AND CALCIUM CHLORIDE 1000 ML: 600; 310; 30; 20 INJECTION, SOLUTION INTRAVENOUS at 01:04

## 2024-01-01 RX ADMIN — FLUDROCORTISONE ACETATE 100 MCG: 0.1 TABLET ORAL at 08:04

## 2024-01-01 RX ADMIN — SODIUM CHLORIDE: 9 INJECTION, SOLUTION INTRAVENOUS at 04:04

## 2024-01-01 RX ADMIN — SODIUM BICARBONATE 50 MEQ: 84 INJECTION, SOLUTION INTRAVENOUS at 05:04

## 2024-01-01 RX ADMIN — VANCOMYCIN HYDROCHLORIDE 1500 MG: 1.5 INJECTION, POWDER, LYOPHILIZED, FOR SOLUTION INTRAVENOUS at 11:04

## 2024-01-01 RX ADMIN — SODIUM CHLORIDE, POTASSIUM CHLORIDE, SODIUM LACTATE AND CALCIUM CHLORIDE 500 ML: 600; 310; 30; 20 INJECTION, SOLUTION INTRAVENOUS at 04:04

## 2024-01-01 RX ADMIN — ACETAMINOPHEN 650 MG: 325 TABLET ORAL at 12:04

## 2024-01-01 RX ADMIN — ONDANSETRON 4 MG: 2 INJECTION INTRAMUSCULAR; INTRAVENOUS at 04:04

## 2024-01-01 RX ADMIN — DEXMEDETOMIDINE HYDROCHLORIDE 0.5 MCG/KG/HR: 4 INJECTION, SOLUTION INTRAVENOUS at 06:04

## 2024-01-01 RX ADMIN — SODIUM CHLORIDE 250 ML: 0.9 INJECTION, SOLUTION INTRAVENOUS at 11:04

## 2024-01-01 RX ADMIN — NOREPINEPHRINE BITARTRATE 1.5 MCG/KG/MIN: 4 INJECTION, SOLUTION INTRAVENOUS at 08:04

## 2024-01-01 RX ADMIN — POTASSIUM CHLORIDE 40 MEQ: 29.8 INJECTION, SOLUTION INTRAVENOUS at 06:04

## 2024-01-01 RX ADMIN — ROCURONIUM BROMIDE 100 MG: 10 INJECTION, SOLUTION INTRAVENOUS at 05:04

## 2024-01-01 RX ADMIN — NOREPINEPHRINE BITARTRATE 0.2 MCG/KG/MIN: 4 INJECTION, SOLUTION INTRAVENOUS at 03:04

## 2024-01-01 RX ADMIN — NOREPINEPHRINE BITARTRATE 0.2 MCG/KG/MIN: 4 INJECTION, SOLUTION INTRAVENOUS at 05:04

## 2024-01-01 RX ADMIN — NOREPINEPHRINE BITARTRATE 1.4 MCG/KG/MIN: 4 INJECTION, SOLUTION INTRAVENOUS at 07:04

## 2024-01-01 RX ADMIN — METRONIDAZOLE 500 MG: 5 INJECTION, SOLUTION INTRAVENOUS at 01:04

## 2024-01-01 RX ADMIN — MORPHINE SULFATE 2 MG: 2 INJECTION, SOLUTION INTRAMUSCULAR; INTRAVENOUS at 04:04

## 2024-01-01 RX ADMIN — MIDAZOLAM HYDROCHLORIDE 2 MG: 1 INJECTION, SOLUTION INTRAMUSCULAR; INTRAVENOUS at 05:04

## 2024-01-01 RX ADMIN — MUPIROCIN: 20 OINTMENT TOPICAL at 08:04

## 2024-01-01 RX ADMIN — MAGNESIUM SULFATE HEPTAHYDRATE 2 G: 40 INJECTION, SOLUTION INTRAVENOUS at 06:04

## 2024-01-01 RX ADMIN — AZITHROMYCIN MONOHYDRATE 500 MG: 500 INJECTION, POWDER, LYOPHILIZED, FOR SOLUTION INTRAVENOUS at 04:04

## 2024-01-01 RX ADMIN — METRONIDAZOLE 500 MG: 5 INJECTION, SOLUTION INTRAVENOUS at 09:04

## 2024-01-01 RX ADMIN — FAMOTIDINE 20 MG: 10 INJECTION, SOLUTION INTRAVENOUS at 10:04

## 2024-01-01 RX ADMIN — INDOMETHACIN 50 MEQ: 25 CAPSULE ORAL at 04:04

## 2024-01-01 RX ADMIN — NOREPINEPHRINE BITARTRATE 2.04 MCG/KG/MIN: 1 INJECTION, SOLUTION, CONCENTRATE INTRAVENOUS at 12:04

## 2024-01-01 RX ADMIN — DEXMEDETOMIDINE HYDROCHLORIDE 0.4 MCG/KG/HR: 4 INJECTION, SOLUTION INTRAVENOUS at 12:04

## 2024-01-01 RX ADMIN — NOREPINEPHRINE BITARTRATE 1.5 MCG/KG/MIN: 1 INJECTION, SOLUTION, CONCENTRATE INTRAVENOUS at 10:04

## 2024-01-01 RX ADMIN — PHYTONADIONE 10 MG: 10 INJECTION, EMULSION INTRAMUSCULAR; INTRAVENOUS; SUBCUTANEOUS at 11:04

## 2024-01-01 RX ADMIN — NOREPINEPHRINE BITARTRATE 0.02 MCG/KG/MIN: 4 INJECTION, SOLUTION INTRAVENOUS at 10:04

## 2024-01-01 RX ADMIN — HEPARIN SODIUM 5000 UNITS: 5000 INJECTION INTRAVENOUS; SUBCUTANEOUS at 01:04

## 2024-01-01 RX ADMIN — IOHEXOL 75 ML: 350 INJECTION, SOLUTION INTRAVENOUS at 11:04

## 2024-01-01 RX ADMIN — POTASSIUM CHLORIDE 10 MEQ: 7.46 INJECTION, SOLUTION INTRAVENOUS at 06:04

## 2024-01-01 RX ADMIN — SODIUM CHLORIDE 250 ML: 0.9 INJECTION, SOLUTION INTRAVENOUS at 12:04

## 2024-01-01 RX ADMIN — SODIUM BICARBONATE 50 MEQ: 84 INJECTION, SOLUTION INTRAVENOUS at 04:04

## 2024-01-01 RX ADMIN — CALCIUM GLUCONATE 1 G: 20 INJECTION, SOLUTION INTRAVENOUS at 11:04

## 2024-01-01 RX ADMIN — VASOPRESSIN 20 UNITS: 20 INJECTION INTRAVENOUS at 07:04

## 2024-01-01 RX ADMIN — MIDAZOLAM 2 MG: 1 INJECTION INTRAMUSCULAR; INTRAVENOUS at 05:04

## 2024-01-01 RX ADMIN — CEFEPIME 1 G: 1 INJECTION, POWDER, FOR SOLUTION INTRAMUSCULAR; INTRAVENOUS at 11:04

## 2024-01-01 RX ADMIN — PHENYLEPHRINE HYDROCHLORIDE 350 MCG: 10 INJECTION INTRAVENOUS at 07:04

## 2024-04-05 PROBLEM — J18.9 LEFT UPPER LOBE PNEUMONIA: Status: ACTIVE | Noted: 2024-01-01

## 2024-04-05 PROBLEM — A41.9 SEPTIC SHOCK: Status: ACTIVE | Noted: 2024-01-01

## 2024-04-05 PROBLEM — J96.01 ACUTE HYPOXEMIC RESPIRATORY FAILURE: Status: ACTIVE | Noted: 2024-01-01

## 2024-04-05 PROBLEM — E11.9 TYPE 2 DIABETES MELLITUS, WITHOUT LONG-TERM CURRENT USE OF INSULIN: Status: ACTIVE | Noted: 2024-01-01

## 2024-04-05 PROBLEM — Z51.5 COMFORT MEASURES ONLY STATUS: Status: ACTIVE | Noted: 2024-01-01

## 2024-04-05 PROBLEM — R65.21 SEPTIC SHOCK: Status: ACTIVE | Noted: 2024-01-01

## 2024-04-05 PROBLEM — E87.20 LACTIC ACIDOSIS: Status: ACTIVE | Noted: 2024-01-01

## 2024-04-05 NOTE — EICU
Intervention Initiated From:  Bedside    Chetan intervened regarding:  Time-Out    Comments: Time out done for intubation . See flowsheet.

## 2024-04-05 NOTE — PLAN OF CARE
[facility]  Discharge Assessment    Primary Care Provider: No, Primary Doctor     Discharge Assessment (most recent)       BRIEF DISCHARGE ASSESSMENT - 04/05/24 9628          Discharge Planning    Assessment Type Discharge Planning Assessment     Resource/Environmental Concerns none     Support Systems Children     Assistance Needed n/a     Equipment Currently Used at Home none     Current Living Arrangements home     Patient/Family Anticipates Transition to inpatient hospice     Patient/Family Anticipated Services at Transition hospice care     DME Needed Upon Discharge  none     Discharge Plan A Comfort care/withdrawal     Discharge Plan B Other   inpatient Hospice       Physical Activity    On average, how many days per week do you engage in moderate to strenuous exercise (like a brisk walk)? Patient unable to answer     On average, how many minutes do you engage in exercise at this level? Patient unable to answer        Financial Resource Strain    How hard is it for you to pay for the very basics like food, housing, medical care, and heating? Patient unable to answer        Housing Stability    In the last 12 months, was there a time when you were not able to pay the mortgage or rent on time? Patient unable to answer     In the last 12 months, how many places have you lived? 1     In the last 12 months, was there a time when you did not have a steady place to sleep or slept in a shelter (including now)? Patient unable to answer        Transportation Needs    In the past 12 months, has lack of transportation kept you from medical appointments or from getting medications? Patient unable to answer     In the past 12 months, has lack of transportation kept you from meetings, work, or from getting things needed for daily living? Patient unable to answer        Food Insecurity    Within the past 12 months, you worried that your food would run out before you got the money to buy more. Patient  unable to answer     Within the past 12 months, the food you bought just didn't last and you didn't have money to get more. Patient unable to answer        Stress    Do you feel stress - tense, restless, nervous, or anxious, or unable to sleep at night because your mind is troubled all the time - these days? Patient unable to answer        Social Connections    In a typical week, how many times do you talk on the phone with family, friends, or neighbors? Patient unable to answer     How often do you get together with friends or relatives? Patient unable to answer     How often do you attend Pentecostal or Amish services? Patient unable to answer     Do you belong to any clubs or organizations such as Pentecostal groups, unions, fraternal or athletic groups, or school groups? Patient unable to answer     How often do you attend meetings of the clubs or organizations you belong to? Patient unable to answer     Are you , , , , never , or living with a partner? Patient unable to answer        Alcohol Use    Q1: How often do you have a drink containing alcohol? Patient unable to answer     Q2: How many drinks containing alcohol do you have on a typical day when you are drinking? Patient unable to answer     Q3: How often do you have six or more drinks on one occasion? Patient unable to answer                   Patient didn't survive this hospitalization.  Family was at the beside as patient  peacefully.      SW provided support and assisted family with concerns as it related to patient's personal items.  Please see note dated 2024/time 3:18 pm.        Kamlesh Peñaloza LMSW  [facility]  [phone]

## 2024-04-05 NOTE — EICU
Intervention Initiated From:  Bedside    Chetan intervened regarding:  Time-Out      Comments: Time out done for left IJ TLC inserted by Chasity Baez NP. See flowsheet.

## 2024-04-05 NOTE — PLAN OF CARE
[facility]  Discharge Final Note    Primary Care Provider: No, Primary Doctor    Expected Discharge Date: 2024    Time of Death: 3:03 pm  Date of Death: 2024  Cause of Death: Acute Hypoxic Respiratory Failure and Septic Shock  Final Discharge Note (most recent)       Final Note - 24 1548          Final Note    Assessment Type Final Discharge Note     Anticipated Discharge Disposition                    Kamlesh Peñaloza LMSW  [facility]  [phone]      Important Message from Medicare

## 2024-04-05 NOTE — EICU
Nurses Note -- 4 Eyes      4/5/2024   2:59 AM      Skin assessed during: Admit      [x] No Altered Skin Integrity Present    []Prevention Measures Documented      [] Yes- Altered Skin Integrity Present or Discovered   [] LDA Added if Not in Epic (Describe Wound)   [] New Altered Skin Integrity was Present on Admit and Documented in LDA   [] Wound Image Taken    Wound Care Consulted? No    Attending Nurse:  Lisbet Chase RN/Staff Member:   [place]

## 2024-04-05 NOTE — ASSESSMENT & PLAN NOTE
S/P mitral clip    History of severe MR s/p PIA. Previous TTE in 2020 with EF 55-60% G1DD.    -- Bedside TTE with IVC extremely collapsible  -- Maintain euvolemia

## 2024-04-05 NOTE — PROGRESS NOTES
"Pharmacokinetic Initial Assessment: IV Vancomycin    Assessment/Plan:    Patient received intravenous vancomycin loading dose of 1500 mg once in the ED.  Unclear baseline scr. Historically scr was 0.7-0.8.  Scr currently up to 1.2. Will pulse dose given patient's age and vasopressor requirements.    Desired empiric serum trough concentration is 15 to 20 mcg/mL  Draw vancomycin random level on 4/5 at 2230 (24 hrs post dose).      Pharmacy will continue to follow and monitor vancomycin.      Please contact pharmacy at extension [phone] with any questions regarding this assessment.     Thank you for the consult,   Veronique Toro       Patient brief summary:  Jeanmerry Peabody is a 83 y.o. female initiated on antimicrobial therapy with IV Vancomycin for treatment of suspected sepsis    Drug Allergies:   Review of patient's allergies indicates:   Allergen Reactions    Amoxicillin     Egg derived     Valsartan Hives    Codeine Nausea And Vomiting    Penicillin Rash       Actual Body Weight:   83 kg    Renal Function:   Estimated Creatinine Clearance: 41.6 mL/min (based on SCr of 1.2 mg/dL).,     Dialysis Method (if applicable):  N/A    CBC (last 72 hours):  Recent Labs   Lab Result Units 04/04/24  2145   WBC K/uL 6.37   Hemoglobin g/dL 9.1*   Hematocrit % 28.8*   Platelets K/uL 100*   Gran % % 12.0*   Lymph % % 57.0*   Mono % % 30.0*   Eosinophil % % 0.0   Basophil % % 0.0   Differential Method  Manual       Metabolic Panel (last 72 hours):  Recent Labs   Lab Result Units 04/04/24  2145   Sodium mmol/L 139   Potassium mmol/L 3.2*   Chloride mmol/L 107   CO2 mmol/L 18*   Glucose mg/dL 117*   BUN mg/dL 28*   Creatinine mg/dL 1.2   Albumin g/dL 3.6   Total Bilirubin mg/dL 4.4*   Alkaline Phosphatase U/L 65   AST U/L 35   ALT U/L 24       Drug levels (last 3 results):  No results for input(s): "VANCOMYCINRA", "VANCORANDOM", "VANCOMYCINPE", "VANCOPEAK", "VANCOMYCINTR", "VANCOTROUGH" in the last 72 hours.    Microbiologic " Results:  Microbiology Results (last 7 days)       Procedure Component Value Units Date/Time    Blood culture x two cultures. Draw prior to antibiotics. [[id number]] Collected: 04/04/24 2243    Order Status: Sent Specimen: Blood from Peripheral, Forearm, Left Updated: 04/04/24 2250    Blood culture x two cultures. Draw prior to antibiotics. [[id number]] Collected: 04/04/24 2243    Order Status: Sent Specimen: Blood from Peripheral, Antecubital, Right Updated: 04/04/24 2250

## 2024-04-05 NOTE — ED TRIAGE NOTES
Pt presents to ED with c/o sudden onset SOB. Reports she has been mildly SOB for the last few days but acutely got worse about an hr pta. Pt initially satting 77% RA. Pt also c/o intermittent nausea and diarrhea since Monday. Reports having outpatient labs done earlier today for an oncology work up. Denies recent fevers, CP, abd pain, dizziness, diaphoresis, HA, CP, and dysuria

## 2024-04-05 NOTE — ACP (ADVANCE CARE PLANNING)
"GOALS OF CARE NOTE    Patient currently does NOT have decision-making capacity. Patient is disoriented and is unable to participate in her medical care decisions at this time. In her stead, I spoke with "Froilan" (son) in person, and "Hanna", (cousin) via telephone. I explained how critically ill Ms Peabody was, including her acute respiratory failure and septic shock. We discussed Ms Peabody's code status in light of her critical condition.     Per the patient's family, they believe a time-limited intubation trial to be reasonable. After discussing the details of what CPR would entail, the family stated that this was not in the best interest of Ms Peabody. Per discussion with Froilan and Hanna, code status confirmed as PARTIAL CODE (NO CPR, but YES time-limited intubation trial).     They state that their treatment goals/wishes are to allow Ms Peabody's lungs some time to recover and to continue full medical care with that goal, including all related procedures. However, they do not wish to add further suffering or morbidity to her condition with chest compressions in the event that her heart were to stop.    Osmel Christopher MD  Internal Medicine PGY-3  [facility]    "

## 2024-04-05 NOTE — PLAN OF CARE
MICU DAILY GOALS     Family/Goals of care/Code Status   Code Status: Partial Code    24H Vital Sign Range  Temp:  [98.1 °F (36.7 °C)-98.8 °F (37.1 °C)]   Pulse:  []   Resp:  [26-64]   BP: ()/(36-96)   SpO2:  [77 %-100 %]   Arterial Line BP: ()/()      Shift Events (include procedures and significant events)   Pt intubated this shift. Currently on levo, vaso, and precedex gtts.     AWAKE RASS: Goal - RASS Goal: 0-->alert and calm  Actual - RASS (Maier Agitation-Sedation Scale): unarousable    Restraint necessity: Not necessary   BREATHE SBT: Not attempted    Coordinate A & B, analgesics/sedatives Pain: managed   SAT: Not attempted   Delirium CAM-ICU: Overall CAM-ICU: Negative   Early(intubated/ Progressive (non-intubated) Mobility MOVE Screen (INTUBATED ONLY): Not attempted    Activity: Activity Management: Patient unable to perform activities   Feeding/Nutrition Diet order: Diet/Nutrition Received: NPO,     Thrombus DVT prophylaxis: VTE Required Core Measure: Pharmacological prophylaxis initiated/maintained   HOB Elevation Head of Bed (HOB) Positioning: HOB at 30 degrees   Ulcer Prophylaxis GI: no   Glucose control managed Glycemic Management: blood glucose monitored   Skin Skin assessed during: Admit    Sacrum intact/not altered? No  Heels intact/not altered? Yes  Surgical wound? No    CHECK ONE!   (no altered skin or altered skin) and sub boxes:  [] No Altered Skin Integrity Present    []Prevention Measures Documented    [x] Altered Skin Integrity Present or Discovered   [x] LDA present in EPIC, daily doc completed              [] LDA added if not in EPIC (describe wound).                    When describing wound, do not stage, use descriptive words only.    [] Wound Image Taken (required on admit,                   transfer/discharge and every Tuesday)    Wound Care Consulted? No    4 EYES:  Attending Nurse (1st set of eyes):     Second RN/Staff Member (2nd set of eyes):    Bowel  Function no issues    Indwelling Catheter Necessity      Urethral Catheter 04/05/24 0619 Non-latex 16 Fr.-Reason for Continuing Urinary Catheterization: Urinary retention, Critically ill in ICU and requiring hourly monitoring of intake/output          De-escalation Antibiotics No        VS and assessment per flow sheet, patient progressing towards goals as tolerated, plan of care reviewed with  Jeanmerry Peabody , all concerns addressed, will continue to monitor.

## 2024-04-05 NOTE — PLAN OF CARE
Case Management Note of Occurrence:    KALYANI met with Awais Philip, adopted son (cp# [phone]) and family at bedside to introduce myself and provide support during this difficult time. While conversing with Awais about emergency contacts, he asked KALYANI to return in about an hour because her biological sisters will be coming later this afternoon to discuss Goals of Care. Per Awais, patient was brought to the ED my his son and patient became incontinent to bowels and bladder.  Patient's clothes were soiled and ER staff had to throw the clothes away, which family believes her wallet was in one of the pockets of the soil pants that were thrown away.    KALYANI contacted Wyatt Uribe, charge nurse for the ED who conveyed that he was unaware of any personal items  extracted from her clothing or placed in the security safe.  KALYANI then contacted the security dept and spoke with officer Arian to file a complaint.  KALYANI provided Officer Arian with patient's room# and MRN to meet with family to complete a report.     3:47 PM  Officer Green here to complete missing item report.  KALYANI provided Officer with a face sheet.     Kamlesh Peñaloza, MARIAN  [facility]  [phone]

## 2024-04-05 NOTE — ASSESSMENT & PLAN NOTE
GOC discussion with pt's family. Pt was transitioned to comfort care after maxing out pressors. Comfort care medications in place. Family would like palliative extubation to be performed. Called to the bedside by pt's nurse at 3:01 pm. Death exam performed and pt time of death 3:03 pm (4/5/24).

## 2024-04-05 NOTE — H&P
[facility]  Critical Care Medicine  History & Physical    Patient Name: Jeanmerry Peabody  MRN: [id number]  Admission Date: 4/4/2024  Hospital Length of Stay: 0 days  Code Status: Full Code  Attending Physician: Alli Aquino MD   Primary Care Provider: Luzmaria, Primary Doctor   Principal Problem: Septic shock    Subjective:     HPI:  83-year-old female with past medical history of hypertension, mitral regurgitation status post mitraclip in 2020, carotid artery stenosis who presents with worsening shortness of breath over the last couple days. Pt is accompanied by son. For the past few days, she has been experiencing worsening sob. Symptoms associated with productive cough and a few episodes of non-bloody emesis. Per son, she was at the Heme/onc clinic earlier today regarding downtrending WBC. Throughout the day her respiratory status continued to decline which prompted them to present to Emergency Department     In the ED, she was originally 77% SpO2. Pt transitioned to HFNC 40L FiO2 60% with improvement in SpO2. Pt also hypotensive. Sepsis dose fluids, broad spectrum abx and peripheral levo initiated. Bedisde US by cardiology concerning for septal bowing. Pt started on heparin gtt. Per cardiology, low suspicion of cardiogenic shock. CT chest pertinent for MELVIN consolidation. MICU consulted for AHRF and septic shock in the setting of MELVIN consolidation.     Hospital/ICU Course:  No notes on file     Past Medical History:   Diagnosis Date    Bilateral carotid artery stenosis 10/6/2020    Essential hypertension 10/6/2020    Severe mitral regurgitation 10/6/2020       Past Surgical History:   Procedure Laterality Date    ANGIOGRAM, CORONARY, WITH LEFT HEART CATHETERIZATION  09/02/2020    APPENDECTOMY      TONSILLECTOMY         Review of patient's allergies indicates:   Allergen Reactions    Amoxicillin     Egg derived     Valsartan Hives    Codeine Nausea And Vomiting    Penicillin Rash       Family  History       Problem Relation (Age of Onset)    Brain cancer Brother    COPD Mother          Tobacco Use    Smoking status: Never     Passive exposure: Never    Smokeless tobacco: Never   Substance and Sexual Activity    Alcohol use: Never    Drug use: Never    Sexual activity: Not on file      Review of Systems   Constitutional:  Negative for chills and fever.   Respiratory:  Positive for cough and shortness of breath.    Cardiovascular:  Negative for chest pain and leg swelling.   Gastrointestinal:  Positive for vomiting. Negative for abdominal pain.   Neurological:  Negative for headaches.     Objective:     Vital Signs (Most Recent):  Temp: 98.8 °F (37.1 °C) (04/05/24 0112)  Pulse: 104 (04/05/24 0150)  Resp: (!) 36 (04/05/24 0150)  BP: (!) 105/51 (04/05/24 0147)  SpO2: 99 % (04/05/24 0150) Vital Signs (24h Range):  Temp:  [98.3 °F (36.8 °C)-98.8 °F (37.1 °C)] 98.8 °F (37.1 °C)  Pulse:  [] 104  Resp:  [26-64] 36  SpO2:  [77 %-100 %] 99 %  BP: ()/(36-61) 105/51   Weight: 83 kg (183 lb)  Body mass index is 27.02 kg/m².      Intake/Output Summary (Last 24 hours) at 4/5/2024 0209  Last data filed at 4/5/2024 0140  Gross per 24 hour   Intake 152.18 ml   Output --   Net 152.18 ml          Physical Exam  Vitals and nursing note reviewed.   HENT:      Head: Normocephalic.      Nose: Nose normal.   Cardiovascular:      Rate and Rhythm: Normal rate and regular rhythm.      Pulses: Normal pulses.      Heart sounds: Normal heart sounds.   Pulmonary:      Comments: Increased WOB  Decreased breath sound left upper lobe  Abdominal:      General: There is no distension.      Palpations: Abdomen is soft.      Tenderness: There is no abdominal tenderness.   Musculoskeletal:      Left lower leg: No edema.   Skin:     General: Skin is warm.   Neurological:      Mental Status: She is oriented to person, place, and time.      Comments: Answering questions and following simple commands            Vents:  Oxygen  Concentration (%): 60 (04/04/24 2316)  Lines/Drains/Airways       Drain  Duration             Female External Urinary Catheter w/ Suction 04/04/24 2151 <1 day              Peripheral Intravenous Line  Duration                  Peripheral IV - Single Lumen 04/04/24 2122 18 G Right Antecubital <1 day         Peripheral IV - Single Lumen 04/04/24 2240 20 G Right Forearm <1 day         Peripheral IV - Single Lumen 04/05/24 0138 20 G Left Forearm <1 day                  Significant Labs:    CBC/Anemia Profile:  Recent Labs   Lab 04/04/24  2145   WBC 6.37   HGB 9.1*   HCT 28.8*   *   *   RDW 21.2*        Chemistries:  Recent Labs   Lab 04/04/24 2145      K 3.2*      CO2 18*   BUN 28*   CREATININE 1.2   CALCIUM 8.3*   ALBUMIN 3.6   PROT 6.4   BILITOT 4.4*   ALKPHOS 65   ALT 24   AST 35       All pertinent labs within the past 24 hours have been reviewed.    Significant Imaging: I have reviewed all pertinent imaging results/findings within the past 24 hours.  Assessment/Plan:     Pulmonary  Left upper lobe pneumonia  See Septic Shock A/P    Acute hypoxemic respiratory failure    Recent Labs     04/05/24  0120   PH 7.133*   PCO2 26.5*   PO2 34*   HCO3 8.9*   POCSATURATED 50   BE -20*       -- Pulse oximetry continuous  -- Supplemental O2 target goal > 93%  -- See Septic Shock A/P      Cardiac/Vascular  Essential hypertension  -- Holding s/o septic shock    Severe mitral regurgitation  S/P mitral clip    History of severe MR s/p PIA. Previous TTE in 2020 with EF 55-60% G1DD.    -- Bedside TTE with IVC extremely collapsible  -- Maintain euvolemia    Renal/  Lactic acidosis  See Septic Shock A/P    ID  * Septic shock  Patient admitted with concerns for septic shock 2/2 pulmonary source; complaining of cough, shortness of breath, productive cough, malaise for several days before acutely worsening on day of admission. +sick contacts (family members with unspecified viral infections)    QSOFA:  hypotension (sBP < 100), AMS (GCS < 15), and tachypnea (RR > 22). WBC 6.37 (relative leukocytosis given baseline leukopenia). Lactate 14.1. UA pending. CXR focal consolidation in MELVIN. CTA chest: no PE, MELVIN consolidation c/w PNA.    Results for orders placed during the hospital encounter of 12/08/20    Echo Color Flow Doppler? Yes; Bubble Contrast? No    Interpretation Summary  · The left ventricle is mildly enlarged with normal systolic function. The estimated ejection fraction is 60%.  · There is left ventricular concentric hypertrophy.  · Grade I diastolic dysfunction.  · Mild mitral regurgitation.  · Mild to moderate tricuspid regurgitation.  · Normal right ventricular size with normal right ventricular systolic function.  · Mild right atrial enlargement.  · Mild to moderate left atrial enlargement.  · The mean diastolic gradient across the mitral valve is 5 mmHg at a heart rate of 85 bpm.  · Normal central venous pressure (3 mmHg).  · The estimated PA systolic pressure is 34 mmHg.  · Excellently placed MitraClip with firm attachment to both mitral leaflets.    -- S/P IVF total 2.5 L   -- Broad spectrum antibiotics: Vanc/Cefepime/Flagyl/Azithromycin; will likely de-escalate the azithromycin + flagyl in coming days per clinical improvement  -- Titrate SpO2 to > 93%   -- Stress dose steroids: hydrocortisone 100 mg q8h, fludrocortisone 100 mg qd  -- Legionella Urine Ag pending  -- S pneumo Urine Ag pending  -- UA / UCX pending  -- F/U BCX  -- Cardiac telemetry  -- Continue trending lactate  -- Strict I/Os    Endocrine  Type 2 diabetes mellitus, without long-term current use of insulin  -- Hold oral hyperglycemics  -- SSI, POCT BG qACHS; titrate basal/bolus regimen PRN to 140-180 goal          Critical Care Daily Checklist:    A: Awake: RASS Goal/Actual Goal:    Actual:     B: Spontaneous Breathing Trial Performed?     C: SAT & SBT Coordinated?  N/a                      D: Delirium: CAM-ICU     E: Early Mobility  Performed? No   F: Feeding Goal:    Status:     Current Diet Order   Procedures    Diet diabetic 2000 Calorie     Order Specific Question:   Total calories:     Answer:   2000 Calorie      AS: Analgesia/Sedation N/a   T: Thromboembolic Prophylaxis Heparin ppx   H: HOB > 300 Yes   U: Stress Ulcer Prophylaxis (if needed) N/A   G: Glucose Control SSI   B: Bowel Function     I: Indwelling Catheter (Lines & Read) Necessity N/A   D: De-escalation of Antimicrobials/Pharmacotherapies VANC/CEFEPIME/FLAGYL/AZITHROMYCIN    Plan for the day/ETD ABX, FLUIDS    Code Status:  Family/Goals of Care: Full Code         Critical secondary to Patient has a condition that poses threat to life and bodily function: SEPTIC SHOCK    Critical care was time spent personally by me on the following activities: development of treatment plan with patient or surrogate and bedside caregivers, discussions with consultants, evaluation of patient's response to treatment, examination of patient, ordering and performing treatments and interventions, ordering and review of laboratory studies, ordering and review of radiographic studies, pulse oximetry, re-evaluation of patient's condition. This critical care time did not overlap with that of any other provider or involve time for any procedures.     Celestine Christopher MD  Critical Care Medicine  [facility]

## 2024-04-05 NOTE — CONSULTS
Patient evaluated by Critical Care. To be admitted to MICU. Full H&P to follow.    Job Purcell MD  [employer] Critical Care Fellow  4/5/2024 @ 1:39 AM

## 2024-04-05 NOTE — EICU
Intervention Initiated From:  Bedside    Chetan intervened regarding:  Time-Out      Comments: TLC placement procedure aborted; patient unable to tolerate lying flat; during procedure patient anxious, RR 40s, HR 130s.  Morphine 2mg given

## 2024-04-05 NOTE — ACP (ADVANCE CARE PLANNING)
Extensive goals of care discussions with son, Froilan, as well as sister Paola and patient's niece, Hanna (ER nurse). Patient to remain full code at this time.    Job Purcell MD  [facility] Critical Care Fellow  4/5/2024 @ 5:25 AM

## 2024-04-05 NOTE — PROCEDURES
"Jeanmerry Peabody is a 83 y.o. female patient.    Temp: 98.7 °F (37.1 °C) (24 025)  Pulse: 110 (24)  Resp: (!) 51 (24)  BP: (!) 123/96 (24)  SpO2: (!) 93 % (24)  Weight: 84 kg (185 lb 3 oz) (24)  Height: 5' 9" (175.3 cm) (24)       Arterial Line    Date/Time: 2024 5:57 AM  Location procedure was performed: [facility] CRITICAL CARE MEDICINE    Performed by: Chasity Baez DNP  Authorized by: Chasity Baez DNP  Pre-op Diagnosis: Shock  Post-operative diagnosis: Shock  Consent Done: Yes  Consent: Verbal consent obtained. Written consent obtained.  Risks and benefits: risks, benefits and alternatives were discussed  Consent given by: Son.  Patient understanding: patient states understanding of the procedure being performed  Patient consent: the patient's understanding of the procedure matches consent given  Required items: required blood products, implants, devices, and special equipment available  Patient identity confirmed: , name, MRN and provided demographic data  Time out: Immediately prior to procedure a "time out" was called to verify the correct patient, procedure, equipment, support staff and site/side marked as required.  Preparation: Patient was prepped and draped in the usual sterile fashion.  Indications: multiple ABGs and hemodynamic monitoring  Location: left radial  Melvin's test normal: yes  Needle gauge: 20  Seldinger technique: Seldinger technique used  Number of attempts: 2  Post-procedure: line sutured and dressing applied  Post-procedure CMS: normal  Patient tolerance: Patient tolerated the procedure well with no immediate complications      [facility]  [facility]  2024 5:58 AM            "

## 2024-04-05 NOTE — HOSPITAL COURSE
Pt was admitted to MICU for AHRF and septic shock in the setting of MELVIN consolidation. Given sepsis protocol fluids. Started on broad spectrum antibiotics, stress dose steroids, and peripheral levo due to persistent hypotension. GOC discussed with family and they would like DNR and temporary intubation to see if patient improves. Family aware of guarded prognosis. Pt had worsening respiratory distress and was intubated. Adjusting vent settings to optimize. Required arterial line and central line due to need for more accurate blood pressures and increased pressor requirement. Following up septic workup labs and continuing antibiotics. Pt now requiring maxed out pressure support with almost maxed out ventilator support. GOC discussion with family who are ok with transferring patient to comfort care and have palliative extubation performed. Was called to the bedside at 3:01 pm due to asystole. Death exam performed. Time of death 3:03 pm (4/5/24).

## 2024-04-05 NOTE — EICU
Intervention Initiated From:  Bedside    Chetan intervened regarding:  Time-Out      Comments: attempt for (L) femoral central line placement aborted, unable to advance catheter.

## 2024-04-05 NOTE — ASSESSMENT & PLAN NOTE
-- Hold oral hyperglycemics  -- SSI, POCT BG qACHS; titrate basal/bolus regimen PRN to 140-180 goal     Please review and advise from pt's my chart message below

## 2024-04-05 NOTE — PLAN OF CARE
Recommendations     Initiate TFs if/when able. Rec'd Impact Peptide @ 45 mL/hr to provide 1620 kcals, 101 g of protein, 832 mL fluid.  RD to monitor & follow-up.     Goals: Meet % EEN, EPN by RD f/u date  Nutrition Goal Status: new  Communication of RD Recs: discussed on rounds

## 2024-04-05 NOTE — HPI
83-year-old female with past medical history of hypertension, mitral regurgitation status post mitraclip in 2020, carotid artery stenosis who presents with worsening shortness of breath over the last couple days. Pt is accompanied by son. For the past few days, she has been experiencing worsening sob. Symptoms associated with productive cough and a few episodes of non-bloody emesis. Per son, she was at the Heme/onc clinic earlier today regarding downtrending WBC. Throughout the day her respiratory status continued to decline which prompted them to present to Emergency Department     In the ED, she was originally 77% SpO2. Pt transitioned to HFNC 40L FiO2 60% with improvement in SpO2. Pt also hypotensive. Sepsis dose fluids, broad spectrum abx and peripheral levo initiated. Bedisde US by cardiology concerning for septal bowing. Pt started on heparin gtt. Per cardiology, low suspicion of cardiogenic shock. CT chest pertinent for MELVIN consolidation. MICU consulted for AHRF and septic shock in the setting of MELVIN consolidation.

## 2024-04-05 NOTE — ASSESSMENT & PLAN NOTE
Patient admitted with concerns for septic shock 2/2 pulmonary source; complaining of cough, shortness of breath, productive cough, malaise for several days before acutely worsening on day of admission. +sick contacts (family members with unspecified viral infections)    QSOFA: hypotension (sBP < 100), AMS (GCS < 15), and tachypnea (RR > 22). WBC 6.37 (relative leukocytosis given baseline leukopenia). Lactate 14.1. UA pending. CXR focal consolidation in MELVIN. CTA chest: no PE, MELVIN consolidation c/w PNA.    Results for orders placed during the hospital encounter of 12/08/20    Echo Color Flow Doppler? Yes; Bubble Contrast? No    Interpretation Summary  · The left ventricle is mildly enlarged with normal systolic function. The estimated ejection fraction is 60%.  · There is left ventricular concentric hypertrophy.  · Grade I diastolic dysfunction.  · Mild mitral regurgitation.  · Mild to moderate tricuspid regurgitation.  · Normal right ventricular size with normal right ventricular systolic function.  · Mild right atrial enlargement.  · Mild to moderate left atrial enlargement.  · The mean diastolic gradient across the mitral valve is 5 mmHg at a heart rate of 85 bpm.  · Normal central venous pressure (3 mmHg).  · The estimated PA systolic pressure is 34 mmHg.  · Excellently placed MitraClip with firm attachment to both mitral leaflets.    -- S/P IVF total 2.5 L   -- Broad spectrum antibiotics: Vanc/Cefepime/Flagyl/Azithromycin; will likely de-escalate the azithromycin + flagyl in coming days per clinical improvement  -- Titrate SpO2 to > 93%   -- Stress dose steroids: hydrocortisone 100 mg q8h, fludrocortisone 100 mg qd  -- Legionella Urine Ag pending  -- S pneumo Urine Ag pending  -- UA / UCX pending  -- F/U BCX  -- Cardiac telemetry  -- Continue trending lactate  -- Strict I/Os

## 2024-04-05 NOTE — ANESTHESIA PROCEDURE NOTES
Ad Hoc Intubation    Date/Time: 4/5/2024 6:13 AM    Performed by: Veronique Stevens MD  Authorized by: Radha Luque MD    Indications:  Respiratory failure and hypoxemia  Diagnosis:  Hypoexmic respiratory failure  Patient Location:  ICU  Timeout:  4/5/2024 6:10 AM  Procedure Start Time:  4/5/2024 6:11 AM  Procedure End Time:  4/5/2024 6:13 AM  Staff:     Anesthesiologist Present: Yes    Intubation:     Induction:  Intravenous    Intubated:  Postinduction    Mask Ventilation:  N/a    Attempts:  1    Attempted By:  Resident anesthesiologist    Method of Intubation:  Video laryngoscopy    Blade:  Gilbert 3    Laryngeal View Grade: Grade I - full view of chords      Difficult Airway Encountered?: No      Complications:  None    Airway Device:  Oral endotracheal tube    Airway Device Size:  7.0    Style/Cuff Inflation:  Cuffed    Tube secured:  21    Secured at:  The lips    Placement Verified By:  Colorimetric ETCO2 device    Complicating Factors:  None    Findings Post-Intubation:  BS equal bilateral and atraumatic/condition of teeth unchanged

## 2024-04-05 NOTE — ED PROVIDER NOTES
Encounter Date: 4/4/2024       History     Chief Complaint   Patient presents with    Shortness of Breath     77% Spo2 in triage. 93% 4L NC applied.Family member stated pt was seen at pcp today     HPI  Arpit is a 83-year-old female with past medical history of hypertension, mitral regurgitation status post mitraclip in 2020, carotid artery stenosis who presents with worsening shortness of breath over the last couple days.  She has been having issues with shortness of breath for some time but the last few days with a lot worsened today was much worse.  She was satting at 77% on room air in triage, does appear to be in respiratory distress.  She denies any chest pain or other pain at this time.  She does not have any history of COPD or smoking.    Review of patient's allergies indicates:   Allergen Reactions    Amoxicillin     Egg derived     Valsartan Hives    Codeine Nausea And Vomiting    Penicillin Rash     Past Medical History:   Diagnosis Date    Bilateral carotid artery stenosis 10/6/2020    Essential hypertension 10/6/2020    Severe mitral regurgitation 10/6/2020     Past Surgical History:   Procedure Laterality Date    ANGIOGRAM, CORONARY, WITH LEFT HEART CATHETERIZATION  09/02/2020    APPENDECTOMY      TONSILLECTOMY       Family History   Problem Relation Age of Onset    COPD Mother     Brain cancer Brother      Social History     Tobacco Use    Smoking status: Never     Passive exposure: Never    Smokeless tobacco: Never   Substance Use Topics    Alcohol use: Never    Drug use: Never     Review of Systems    Physical Exam     Initial Vitals [04/04/24 2110]   BP Pulse Resp Temp SpO2   (!) 134/54 109 (!) 26 98.3 °F (36.8 °C) (S) (!) 77 %      MAP       --         Physical Exam  General: Awake and alert, well-nourished  HENT: tacky mucous membranes  Eyes: No conjunctival injection  Pulm: decreased breath sounds L upper lobe, increased work of breathing, tachypnea, bibasilar crackles  CV: Sinus  tachycardia, no murmur noted  Abdomen: Nondistended, non-tender to palpation  MSK: No LE edema  Skin: No rash noted  Neuro: No facial asymmetry, grossly normal movements of arms and legs  Psychiatric: Cooperative    ED Course   Procedures  Labs Reviewed   CBC W/ AUTO DIFFERENTIAL - Abnormal; Notable for the following components:       Result Value    RBC 2.51 (*)     Hemoglobin 9.1 (*)     Hematocrit 28.8 (*)      (*)     MCH 36.3 (*)     MCHC 31.6 (*)     RDW 21.2 (*)     Platelets 100 (*)     nRBC 17 (*)     Gran % 12.0 (*)     Lymph % 57.0 (*)     Mono % 30.0 (*)     Platelet Estimate Decreased (*)     All other components within normal limits    Narrative:     Release to patient->Immediate   COMPREHENSIVE METABOLIC PANEL - Abnormal; Notable for the following components:    Potassium 3.2 (*)     CO2 18 (*)     Glucose 117 (*)     BUN 28 (*)     Calcium 8.3 (*)     Total Bilirubin 4.4 (*)     eGFR 45.2 (*)     All other components within normal limits    Narrative:     Release to patient->Immediate   TROPONIN I - Abnormal; Notable for the following components:    Troponin I 0.030 (*)     All other components within normal limits    Narrative:     Release to patient->Immediate   B-TYPE NATRIURETIC PEPTIDE - Abnormal; Notable for the following components:     (*)     All other components within normal limits   D DIMER, QUANTITATIVE - Abnormal; Notable for the following components:    D-Dimer >33.00 (*)     All other components within normal limits   PROTIME-INR - Abnormal; Notable for the following components:    Prothrombin Time 17.3 (*)     INR 1.7 (*)     All other components within normal limits   ISTAT PROCEDURE - Abnormal; Notable for the following components:    POC PH 7.220 (*)     POC PCO2 48.7 (*)     POC PO2 24 (*)     POC HCO3 19.9 (*)     POC BE -8 (*)     POC TCO2 21 (*)     All other components within normal limits   ISTAT LACTATE - Abnormal; Notable for the following components:    POC  Lactate 5.77 (*)     All other components within normal limits   CULTURE, BLOOD   CULTURE, BLOOD   HIV 1 / 2 ANTIBODY    Narrative:     Release to patient->Immediate   HEPATITIS C ANTIBODY    Narrative:     Release to patient->Immediate   APTT   CBC W/ AUTO DIFFERENTIAL   PROTIME-INR   APTT   URINALYSIS, REFLEX TO URINE CULTURE   APTT   CBC W/ AUTO DIFFERENTIAL   COMPREHENSIVE METABOLIC PANEL   MAGNESIUM   PHOSPHORUS   CBC W/ AUTO DIFFERENTIAL   POCT GLUCOSE MONITORING CONTINUOUS          Imaging Results               CTA Chest Non-Coronary (PE Studies) (Final result)  Result time 04/05/24 01:01:19      Final result by Tae Brandon MD (04/05/24 01:01:19)                   Impression:      Large geographic region of airspace opacification in the left upper lobe, suspicious for pneumonia.  Other underlying pathology, such as neoplasm, not excluded.    Additional bilateral lower lobe pulmonary opacities are favored to represent subsegmental atelectasis; pneumonia, neoplasm, or other etiology not fully excluded.    Mediastinal lymphadenopathy, likely reactive.  Other pathology, such as neoplasm or lymphoproliferative disorder, not excluded.    For the above, recommendations include clinical correlation, short-term follow-up chest x-rays, and a repeat chest CT in 3-6 months.    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    Mild to moderately gas filled thoracic esophagus, both above and below the apparent right subclavian artery, possibly related to GERD, dysmotility, or other abnormality.  Correlate clinically.  Consider follow-up outpatient esophageal studies.    Scattered atherosclerotic changes.    Biatrial cardiac chamber enlargement, including marked left atrial chamber enlargement.    Poorly-visualized mitral valve clip.    Additional observations as detailed in the body of the report.    This report was flagged in Epic as  abnormal.      Electronically signed by: Tae Brandon  Date:    04/05/2024  Time:    01:01               Narrative:    EXAMINATION:  CTA CHEST NON CORONARY (PE STUDIES)    CLINICAL HISTORY:  Pulmonary embolism (PE) suspected, high prob;    TECHNIQUE:  Low dose axial images, sagittal and coronal reformations were obtained from the thoracic inlet to the lung bases following the IV administration of 100 mL of Omnipaque 350.  Contrast timing was optimized to evaluate the pulmonary arteries.  MIP images were performed.    COMPARISON:  Portable chest x-ray 04/04/2024    FINDINGS:  Artifacts related to beam hardening and/or motion significantly degrade numerous images.    THORACIC INLET: Thyromegaly, including fullness of the thyroid isthmus (an imaging finding which may be associated hyperthyroidism).    AIRWAYS: The thoracic trachea and mainstem bronchi are patent bilaterally.  The partially collapsed appearance of the mainstem bronchi likely related to motion/respiration during the scan (bronchomalacia is less likely).    THORACIC ESOPHAGUS: Extensively gas-filled and mildly distended, above and predominantly below the mild esophageal compression by the aberrant right subclavian artery.    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    The left common carotid artery demonstrates a slightly retroesophageal course.    Scattered atherosclerosis    PULMONARY ARTERIES: No filling defects suspicious for thromboemboli through the level of the proximal portions of the segmental pulmonary arteries.  Assessment of some of the distal portions of the segmental pulmonary arteries, and of the subsegmental pulmonary arteries, is limited by motion artifact.    MEDIASTINUM/BUDDY: Mediastinal lymphadenopathy, including a 2 x 1.6 cm subcarinal lymph  node and a 2.3 x 1.4 cm left AP window lymph node.    Left hilar lymph node enlargement is neither confirmed nor fully exclude.    No significant right hilar lymph node enlargement, by size criteria.    No mediastinal hematoma or pneumomediastinum.    HEART/PERICARDIUM: Biatrial chamber enlargement.  Poorly visualized mitral valve clip.  Mitral valve annular calcification.  Some focal calcification is poorly visualize in the region of the aortic valve.    Trace pericardial fluid, likely physiologic.    Coronary arteries incompletely visualized.    LUNGS: Large region of confluent pulmonary airspace opacification in the left upper lobe, extending to the hilum.    There is an additional small focus of airspace opacification in the right lower lobe infrahilar region.    A planar dorsolateral subpleural opacity in the right lower lobe, and another in the dorsal medial left lower lobe, most likely represent subsegmental atelectasis.  Additional subsegmental atelectasis suggested in either posterior costophrenic sulcus.    PLEURA: No pneumothorax or pleural fluid.    UPPER ABDOMEN: Mildly thickened appearance of the adrenal limbs, left greater than right.  The left adrenal gland is not fully included in these images, however.    BODY WALL: No acute CT abnormality.    MUSCULOSKELETAL: Scattered degenerative changes, including multilevel thoracic intervertebral disc vacuum phenomenon..  Osteopenia.     IMAGES: No additional contributory findings.                                       X-Ray Chest AP Portable (Final result)  Result time 04/04/24 22:17:54      Final result by Ramiro Piper DO (04/04/24 22:17:54)                   Impression:      Focal consolidation in the left upper lung, compatible with pneumonia versus a neoplastic process.  Recommend further evaluation with CT of the chest.      Electronically signed by: Ramiro Piper  Date:    04/04/2024  Time:    22:17               Narrative:     EXAMINATION:  XR CHEST AP PORTABLE    CLINICAL HISTORY:  Shortness of breath    TECHNIQUE:  Single frontal view of the chest was performed.    COMPARISON:  None    FINDINGS:  There is a focal consolidation in the left upper lung.  The lungs are otherwise clear.  The pleural spaces are clear.  The cardiac silhouette is enlarged.  There are calcifications of the aortic arch.  Osseous structures demonstrate degenerative changes.                                       Medications   NORepinephrine bitartrate-D5W 4 mg/250 mL (16 mcg/mL) PERIPHERAL access infusion (0.2 mcg/kg/min × 83 kg Intravenous Verify Only 4/5/24 0112)   heparin 25,000 units in dextrose 5% 250 mL (100 units/mL) infusion HIGH INTENSITY nomogram - [facility] (18 Units/kg/hr × 72.9 kg (Adjusted) Intravenous New Bag 4/5/24 0054)   heparin 25,000 units in dextrose 5% (100 units/ml) IV bolus from bag HIGH INTENSITY nomogram - [facility] (has no administration in time range)   heparin 25,000 units in dextrose 5% (100 units/ml) IV bolus from bag HIGH INTENSITY nomogram - [facility] (has no administration in time range)   sodium chloride 0.9% flush 10 mL (has no administration in time range)   acetaminophen tablet 650 mg (has no administration in time range)   ondansetron injection 4 mg (has no administration in time range)   lactated ringers bolus 1,000 mL (1,000 mLs Intravenous New Bag 4/5/24 0108)   vancomycin - pharmacy to dose (has no administration in time range)   ceFEPIme (MAXIPIME) 1 g in dextrose 5 % in water (D5W) 100 mL IVPB (MB+) (has no administration in time range)   metronidazole IVPB 500 mg (has no administration in time range)   glucose chewable tablet 16 g (has no administration in time range)   glucose chewable tablet 24 g (has no administration in time range)   glucagon (human recombinant) injection 1 mg (has no administration in time range)   insulin aspart U-100 pen 0-10 Units (has no administration in time range)   dextrose 10% bolus 125 mL 125 mL (has no  administration in time range)   dextrose 10% bolus 250 mL 250 mL (has no administration in time range)   fludrocortisone tablet 100 mcg (has no administration in time range)   hydrocortisone sodium succinate injection 100 mg (has no administration in time range)   vancomycin 1,500 mg in dextrose 5 % (D5W) 250 mL IVPB (Vial-Mate) (0 mg Intravenous Stopped 4/5/24 0108)   ceFEPIme (MAXIPIME) 1 g in dextrose 5 % in water (D5W) 100 mL IVPB (MB+) (0 g Intravenous Stopped 4/4/24 2315)   sodium chloride 0.9% bolus 250 mL 250 mL (0 mLs Intravenous Stopped 4/5/24 0015)   heparin 25,000 units in dextrose 5% (100 units/ml) IV bolus from bag HIGH INTENSITY nomogram - [facility] (5,832 Units Intravenous Bolus from Bag 4/5/24 0054)   iohexoL (OMNIPAQUE 350) injection 75 mL (75 mLs Intravenous Given 4/4/24 2358)   sodium chloride 0.9% bolus 250 mL 250 mL (0 mLs Intravenous Stopped 4/5/24 0108)     Medical Decision Making  Pt initially in moderate respiratory distress, hypoxic to 70s on room air, still answering questions and mentating well but working to breath, requiring 5L O2 via nasal cannula initially but still with increased work of breathing so requested respiratory to place on Bipap.  Initial EKG with elevation in aVR and diffuse ST depressions concerning for ischemic change though may be rate related.  She has no chest pain, cardiac squeeze not grossly reduced on my bedside echo.  She has B lines throughout the L lung but less on the right.  I discussed pt with cardiology and they felt they would not page STEMI at this time but would come evaluate pt at bedside.  CXR with L upper lung infiltrate likely pneumonia vs malignancy.  Clinically pneumonia seems more likely, PE also consideration in setting of possible malignancy.  Broad spectrum abx given and blood cultures ordered.  She is allergic to penicillins so cefepime given instead of zosyn.  BP trended down so levophed started peripherally.  Giving fluids very gently in 250ml  increments as I think she is at high risk of worsening respiratory status if fluids given aggressively even though she may be somewhat volume down.   BP worsened on Bipap so she was switched to vapotherm at 40L with good improvement in BP on levophed and good improvement in her respiratory status.  She is breathing much more comfortably on vapotherm and now only in mild respiratory distress.  Initial lactate elevated at 5.7, she has respiratory and metabolic acidosis.  Cardiology at bedside felt no evidence of MI at this time, initial troponin only mildly elevated, BNP is quite elevated.  D dimer elevated.  CTA chest for PE without evidence of PE but with significant likely pneumonia on the left.  ICU consulted for further management and admission.  ICU adding flagyl for additional abx coverage.  Pt is critically ill at time of admission to ICU and still on levophed though pulmonary status has improved since arrival to ED.      Critical Care:  Date: 04/05/2024  Performed by: Dr. Marc Albright  Authorized by: Dr. Marc Albright  Total critical care time (exclusive of procedural time) : 60 minutes  Critical care was necessary to treat or prevent imminent or life-threatening deterioration of the following conditions:  Hypoxemic respiratory failure, septic shock      Amount and/or Complexity of Data Reviewed  Labs: ordered. Decision-making details documented in ED Course.  Radiology: ordered.    Risk  Prescription drug management.  Decision regarding hospitalization.    Critical Care  Total time providing critical care: 60 minutes                                      Clinical Impression:  Final diagnoses:  [R06.02] SOB (shortness of breath)  [R06.02] Shortness of breath          ED Disposition Condition    Admit                 Marc Albright MD  04/05/24 7870

## 2024-04-05 NOTE — PROCEDURES
"Jeanmerry Peabody is a 83 y.o. female patient.    Temp: 98.7 °F (37.1 °C) (04/05/24 0256)  Pulse: 110 (04/05/24 0446)  Resp: (!) 51 (04/05/24 0446)  BP: (!) 123/96 (04/05/24 0446)  SpO2: (!) 93 % (04/05/24 0446)  Weight: 84 kg (185 lb 3 oz) (04/05/24 0300)  Height: 5' 9" (175.3 cm) (04/04/24 2110)       Central Line    Date/Time: 4/5/2024 5:27 AM    Performed by: Chasity Baez DNP  Authorized by: Chasity Baez DNP    Location procedure was performed:  [facility]IT[facility]IC[facility]  Pre-operative diagnosis:  Shock  Post-operative diagnosis:  Shock  Consent Done ?:  Yes  Time out complete?: Verified correct patient, procedure, equipment, staff, and site/side    Indications:  Med administration  Anesthesia:  Local infiltration  Local anesthetic:  Lidocaine 1% without epinephrine  Anesthetic total (ml):  1  Preparation:  Skin prepped with ChloraPrep  Skin prep agent dried: Skin prep agent completely dried prior to procedure    Sterile barriers: All five maximal sterile barriers used - gloves, gown, cap, mask and large sterile sheet    Hand hygiene: Hand hygiene performed immediately prior to central venous catheter insertion    Location:  Left internal jugular  Catheter type:  Triple lumen  Catheter size:  6 Fr  Ultrasound guidance: Yes    Vessel Caliber:  Medium   patent  Comprressibility:  Normal  Doppler:  Not done  Needle advanced into vessel with real time ultrasound guidance.    Guidewire confirmed in vessel.    Image recorded and saved.    Steril sheath on probe.    Sterile gel used.  Manometry: Yes    Number of attempts:  1  Securement:  Line sutured, chlorhexidine patch, sterile dressing applied and blood return through all ports  XRay:  Placement verified by x-ray, no pneumothorax on x-ray, tip termination and successful placement  Adverse Events:  None      CLIFF Swann  Critical Care Medicine  04/05/2024 5:28 AM        "

## 2024-04-05 NOTE — CONSULTS
"[facility] - Cardiac Medical ICU  Adult Nutrition  Consult Note    SUMMARY     Recommendations    Initiate TFs if/when able. Rec'd Impact Peptide @ 45 mL/hr to provide 1620 kcals, 101 g of protein, 832 mL fluid.  RD to monitor & follow-up.    Goals: Meet % EEN, EPN by RD f/u date  Nutrition Goal Status: new  Communication of RD Recs: discussed on rounds    Assessment and Plan    Nutrition Problem:  Inadequate energy intake    Related to (etiology):   Inability to consume sufficient energy     Signs and Symptoms (as evidenced by):   NPO    Interventions(treatment strategy):  Collaboration of nutrition care w/ other providers  EN?    Nutrition Diagnosis Status:   New     Reason for Assessment    Reason For Assessment: consult  Diagnosis: other (see comments) (Septic shock)  Relevant Medical History: HTN  Interdisciplinary Rounds: attended    General Information Comments: Pt intubated this AM - unsure of energy intake PTA. Per chart review, pt w/ UBW of 195#. Unable to complete NFPE 2/2 team at bedside; unable to assess for malnutrition.  Nutrition Discharge Planning: Pending clinical course    Nutrition/Diet History    Food Allergies: egg  Factors Affecting Nutritional Intake: on mechanical ventilation, NPO    Anthropometrics    Temp: 98.1 °F (36.7 °C)  Height Method: Stated  Height: 5' 9" (175.3 cm)  Height (inches): 69 in  Weight Method: Stated  Weight: 84 kg (185 lb 3 oz)  Weight (lb): 185.19 lb  Ideal Body Weight (IBW), Female: 145 lb  % Ideal Body Weight, Female (lb): 127.72 %  BMI (Calculated): 27.3  BMI Grade: 25 - 29.9 - overweight    Lab/Procedures/Meds    Pertinent Labs Reviewed: reviewed  Pertinent Labs Comments: Creat 1.7, GFR 29.8  Pertinent Medications Reviewed: reviewed  Pertinent Medications Comments: Precedex, Levophed, Vasopressin    Estimated/Assessed Needs    Weight Used For Calorie Calculations: 84 kg (185 lb 3 oz)    Energy Calorie Requirements (kcal): 1636 kcal/d  Energy Need Method: Jimmy " State    Protein Requirements: 101-126 g/d (1.2-1.5 g/kg)  Weight Used For Protein Calculations: 84 kg (185 lb 3 oz)    Estimated Fluid Requirement Method: other (see comments) (Per MD)  RDA Method (mL): 1636    Nutrition Prescription Ordered    Current Diet Order: NPO    Evaluation of Received Nutrient/Fluid Intake    I/O: +3.9L since admit    Comments: LBM: 4/4    Nutrition Risk    Level of Risk/Frequency of Follow-up:  (1x/week)     Monitor and Evaluation    Food and Nutrient Intake: enteral nutrition intake, food and beverage intake, energy intake  Food and Nutrient Adminstration: diet order, enteral and parenteral nutrition administration  Physical Activity and Function: nutrition-related ADLs and IADLs  Anthropometric Measurements: weight, weight change  Biochemical Data, Medical Tests and Procedures: inflammatory profile, lipid profile, glucose/endocrine profile, gastrointestinal profile, electrolyte and renal panel  Nutrition-Focused Physical Findings: overall appearance     Nutrition Follow-Up    RD Follow-up?: Yes

## 2024-04-05 NOTE — ASSESSMENT & PLAN NOTE
Recent Labs     04/05/24  0120   PH 7.133*   PCO2 26.5*   PO2 34*   HCO3 8.9*   POCSATURATED 50   BE -20*       -- Pulse oximetry continuous  -- Supplemental O2 target goal > 93%  -- See Septic Shock A/P

## 2024-04-05 NOTE — SIGNIFICANT EVENT
Death Note    Called to bedside on 4/5/2024 at 3:01 pm  by patient's nurse. Nursing at beside, nursing supervisor notified. Family at bedside.    Patient is not responding to verbal or tactile stimuli.  Patient does not have a pupillary or corneal reflex.  Patient's pupils are fixed and dilated.  No heart or breath sounds on auscultation.  No respirations.  No palpable pulses.    Time of death is 4/5/2024 at 3:03 pm.     Signing Physician:    Tamra Espinosa MD PGY 1 Internal Medicine  [facility]  Email: [email or web address]

## 2024-04-05 NOTE — ACP (ADVANCE CARE PLANNING)
Shifted started with Ms. Peabody on 0.9 of leovphed and by 7:35 she was up to 1.4 of levo with Maps in the mid 60s. Explained to her son that we are having to rapidly increase her requirements which leads us to believe she is actively dying. He stated that he understood that was the case and that the medications were just to prolong her life to allow family to come to see her. I explained that due the the rate that we are having to increase her vasopressor requirements that she will be on the maximal amount of medications that we can give her soon and once we get to that point we will need to continue discussions regarding a more comfort focused methods of care. He agreed with that.     Currently, he is calling family to come see her. We will continue out current mode of care until we max out the vasopressors and have further discussions.     Bushra Allen MD PGY VI  [employer] Pulmonary/Critical Care Fellow

## 2024-04-05 NOTE — ASSESSMENT & PLAN NOTE
-- Hold oral hyperglycemics  -- SSI, POCT BG qACHS; titrate basal/bolus regimen PRN to 140-180 goal

## 2024-04-05 NOTE — CARE UPDATE
Was asked to evaluate patient for questionable cardiogenic shock.     She had PIA of mitral valve for posterior flail leaflet in 2020. Otherwise she has HTN and lives independently at home. She has been experiencing low WBC counts and is being seen by [facility] for that.     Presents with acute onset shortness of breath and cough that has gotten worse in the last 2-3 days. She was hypoxic responding to supplemental O2. A CXR shows a MELVIN consolidation. Labs with a lac acid of 5. She is warm on exam. No evidence of acute heart failure. A bedside echo with normal LVEF. Her MitraClip in position with trace MR. She has a D-shaped septum suggesting pressure overload. Unable to get a TR jet to assess ePASP. No McConnel sign.     ECG evaluated and shows no signs of acute ischemia.     Bilirubin is elevated. Troponin minimally elevated. She is hypotensive and was started on Levophed.     Unlikely that with her excellent cardiac function that her presentation is cardiogenic in origin. Further, with CXR findings and her clinical presentation, PE or lung related issues such as infection are more likely.

## 2024-04-05 NOTE — DISCHARGE SUMMARY
[facility] - Cardiac Medical [facility]  Critical Care Medicine  Discharge Summary      Patient Name: Jeanmerry Peabody  MRN: [id number]  Admission Date: 4/4/2024  Hospital Length of Stay: 0 days  Discharge Date and Time: 4/9/2024   Attending Physician: Alli Aquino MD   Discharging Provider: Tamra Espinosa MD  Primary Care Provider: No, Primary Doctor  Reason for Admission: acute hypoxic respiratory failure, septic shock    HPI:   83-year-old female with past medical history of hypertension, mitral regurgitation status post mitraclip in 2020, carotid artery stenosis who presents with worsening shortness of breath over the last couple days. Pt is accompanied by son. For the past few days, she has been experiencing worsening sob. Symptoms associated with productive cough and a few episodes of non-bloody emesis. Per son, she was at the Heme/onc clinic earlier today regarding downtrending WBC. Throughout the day her respiratory status continued to decline which prompted them to present to Emergency Department     In the ED, she was originally 77% SpO2. Pt transitioned to HFNC 40L FiO2 60% with improvement in SpO2. Pt also hypotensive. Sepsis dose fluids, broad spectrum abx and peripheral levo initiated. Bedisde US by cardiology concerning for septal bowing. Pt started on heparin gtt. Per cardiology, low suspicion of cardiogenic shock. CT chest pertinent for MELVIN consolidation. MICU consulted for AHRF and septic shock in the setting of MELVIN consolidation.     * No surgery found *    Indwelling Lines/Drains at Time of Discharge:   Lines/Drains/Airways       Central Venous Catheter Line  Duration             Percutaneous Central Line - Triple Lumen  04/05/24 0520 Internal Jugular Left <1 day              Drain  Duration                  NG/OG Tube 04/05/24 0620 [place] sump 16 Fr. Right nostril <1 day         Urethral Catheter 04/05/24 0619 Non-latex 16 Fr. <1 day              Arterial Line  Duration             Arterial Line  04/05/24 0553 Left Radial <1 day                  Hospital Course:   Pt was admitted to MICU for AHRF and septic shock in the setting of MELVIN consolidation. Given sepsis protocol fluids. Started on broad spectrum antibiotics, stress dose steroids, and peripheral levo due to persistent hypotension. GOC discussed with family and they would like DNR and temporary intubation to see if patient improves. Family aware of guarded prognosis. Pt had worsening respiratory distress and was intubated. Adjusting vent settings to optimize. Required arterial line and central line due to need for more accurate blood pressures and increased pressor requirement. Following up septic workup labs and continuing antibiotics. Pt now requiring maxed out pressure support with almost maxed out ventilator support. GOC discussion with family who are ok with transferring patient to comfort care and have palliative extubation performed. Was called to the bedside at 3:01 pm due to asystole. Death exam performed. Time of death 3:03 pm (4/5/24).     Physical Exam:  Patient is not responding to verbal or tactile stimuli.  Patient does not have a pupillary or corneal reflex.  Patient's pupils are fixed and dilated.  No heart or breath sounds on auscultation.  No respirations.  No palpable pulses.    Consults (From admission, onward)          Status Ordering Provider     Inpatient consult to Registered Dietitian/Nutritionist  Once        Provider:  (Not yet assigned)    Completed DEVONTE BOLANOS     Pharmacy to dose Vancomycin consult  Once        Provider:  (Not yet assigned)   See Hyperspace for full Linked Orders Report.    Acknowledged JESE TELLEZ     Inpatient consult to Critical Care Medicine  Once        Provider:  (Not yet assigned)    Completed MALIK HOFFMAN     Inpatient consult to Cardiology  Once        Provider:  (Not yet assigned)    Completed SYLVIA BANUELOS          Significant Labs:  All pertinent labs within  the past 24 hours have been reviewed.    Significant Imaging:  I have reviewed all pertinent imaging results/findings within the past 24 hours.    Pending Diagnostic Studies:       Procedure Component Value Units Date/Time    Legionella antigen, urine [[id number]] Collected: 04/05/24 0644    Order Status: Sent Lab Status: In process Updated: 04/05/24 0901    Specimen: Urine, Catheterized     Strep Pneumo AG Urine [[id number]] Collected: 04/05/24 0644    Order Status: Sent Lab Status: In process Updated: 04/05/24 0901    Specimen: Urine, Catheterized           Final Active Diagnoses:    Diagnosis Date Noted POA    PRINCIPAL PROBLEM:  Septic shock [A41.9, R65.21] 04/05/2024 Unknown    Type 2 diabetes mellitus, without long-term current use of insulin [E11.9] 04/05/2024 Unknown    Acute hypoxemic respiratory failure [J96.01] 04/05/2024 Unknown    Left upper lobe pneumonia [J18.9] 04/05/2024 Unknown    Lactic acidosis [E87.20] 04/05/2024 Unknown    Comfort measures only status [Z51.5] 04/05/2024 Not Applicable    Essential hypertension [I10] 10/06/2020 Yes     Chronic    Severe mitral regurgitation [I34.0] 10/06/2020 Yes     Chronic      Problems Resolved During this Admission:     Pulmonary  Left upper lobe pneumonia  See Septic Shock A/P    Acute hypoxemic respiratory failure    Recent Labs     04/05/24  0706   PH 7.194*   PCO2 55.8*   PO2 146*   HCO3 21.5*   POCSATURATED 99   BE -7*       -- Pulse oximetry continuous  -- Supplemental O2 target goal > 93%  -- See Septic Shock A/P      Cardiac/Vascular  Essential hypertension  -- Holding s/o septic shock    Severe mitral regurgitation  S/P mitral clip    History of severe MR s/p PIA. Previous TTE in 2020 with EF 55-60% G1DD.    -- Bedside TTE with IVC extremely collapsible  -- Maintain euvolemia    Renal/  Lactic acidosis  See Septic Shock A/P    ID  * Septic shock  Patient admitted with concerns for septic shock 2/2 pulmonary source; complaining of cough, shortness  of breath, productive cough, malaise for several days before acutely worsening on day of admission. +sick contacts (family members with unspecified viral infections)    QSOFA: hypotension (sBP < 100), AMS (GCS < 15), and tachypnea (RR > 22). WBC 6.37 (relative leukocytosis given baseline leukopenia). Lactate 14.1. UA pending. CXR focal consolidation in MELVIN. CTA chest: no PE, MELVIN consolidation c/w PNA.    Results for orders placed during the hospital encounter of 12/08/20    Echo Color Flow Doppler? Yes; Bubble Contrast? No    Interpretation Summary  · The left ventricle is mildly enlarged with normal systolic function. The estimated ejection fraction is 60%.  · There is left ventricular concentric hypertrophy.  · Grade I diastolic dysfunction.  · Mild mitral regurgitation.  · Mild to moderate tricuspid regurgitation.  · Normal right ventricular size with normal right ventricular systolic function.  · Mild right atrial enlargement.  · Mild to moderate left atrial enlargement.  · The mean diastolic gradient across the mitral valve is 5 mmHg at a heart rate of 85 bpm.  · Normal central venous pressure (3 mmHg).  · The estimated PA systolic pressure is 34 mmHg.  · Excellently placed MitraClip with firm attachment to both mitral leaflets.    -- S/P IVF total 2.5 L   -- Broad spectrum antibiotics: Vanc/Cefepime/Flagyl/Azithromycin; will likely de-escalate the azithromycin + flagyl in coming days per clinical improvement  -- Titrate SpO2 to > 93%   -- Stress dose steroids: hydrocortisone 100 mg q8h, fludrocortisone 100 mg qd  -- Legionella Urine Ag pending  -- S pneumo Urine Ag pending  -- UA / UCX pending  -- F/U BCX  -- Cardiac telemetry  -- Continue trending lactate  -- Strict I/Os    Endocrine  Type 2 diabetes mellitus, without long-term current use of insulin  -- Hold oral hyperglycemics  -- SSI, POCT BG qACHS; titrate basal/bolus regimen PRN to 140-180 goal      Palliative Care  Comfort measures only status  GO  discussion with pt's family. Pt was transitioned to comfort care after maxing out pressors. Comfort care medications in place. Family would like palliative extubation to be performed. Called to the bedside by pt's nurse at 3:01 pm. Death exam performed and pt time of death 3:03 pm (24).       Discharged Condition:     Disposition:       Patient Instructions:   No discharge procedures on file.  Medications:  None     Tamra Espinosa MD  Critical Care Medicine  [facility] - Cardiac Medical ICU

## 2024-04-05 NOTE — SUBJECTIVE & OBJECTIVE
Past Medical History:   Diagnosis Date    Bilateral carotid artery stenosis 10/6/2020    Essential hypertension 10/6/2020    Severe mitral regurgitation 10/6/2020       Past Surgical History:   Procedure Laterality Date    ANGIOGRAM, CORONARY, WITH LEFT HEART CATHETERIZATION  09/02/2020    APPENDECTOMY      TONSILLECTOMY         Review of patient's allergies indicates:   Allergen Reactions    Amoxicillin     Egg derived     Valsartan Hives    Codeine Nausea And Vomiting    Penicillin Rash       Family History       Problem Relation (Age of Onset)    Brain cancer Brother    COPD Mother          Tobacco Use    Smoking status: Never     Passive exposure: Never    Smokeless tobacco: Never   Substance and Sexual Activity    Alcohol use: Never    Drug use: Never    Sexual activity: Not on file      Review of Systems   Constitutional:  Negative for chills and fever.   Respiratory:  Positive for cough and shortness of breath.    Cardiovascular:  Negative for chest pain and leg swelling.   Gastrointestinal:  Positive for vomiting. Negative for abdominal pain.   Neurological:  Negative for headaches.     Objective:     Vital Signs (Most Recent):  Temp: 98.8 °F (37.1 °C) (04/05/24 0112)  Pulse: 104 (04/05/24 0150)  Resp: (!) 36 (04/05/24 0150)  BP: (!) 105/51 (04/05/24 0147)  SpO2: 99 % (04/05/24 0150) Vital Signs (24h Range):  Temp:  [98.3 °F (36.8 °C)-98.8 °F (37.1 °C)] 98.8 °F (37.1 °C)  Pulse:  [] 104  Resp:  [26-64] 36  SpO2:  [77 %-100 %] 99 %  BP: ()/(36-61) 105/51   Weight: 83 kg (183 lb)  Body mass index is 27.02 kg/m².      Intake/Output Summary (Last 24 hours) at 4/5/2024 0209  Last data filed at 4/5/2024 0140  Gross per 24 hour   Intake 152.18 ml   Output --   Net 152.18 ml          Physical Exam  Vitals and nursing note reviewed.   HENT:      Head: Normocephalic.      Nose: Nose normal.   Cardiovascular:      Rate and Rhythm: Normal rate and regular rhythm.      Pulses: Normal pulses.      Heart  sounds: Normal heart sounds.   Pulmonary:      Comments: Increased WOB  Decreased breath sound left upper lobe  Abdominal:      General: There is no distension.      Palpations: Abdomen is soft.      Tenderness: There is no abdominal tenderness.   Musculoskeletal:      Left lower leg: No edema.   Skin:     General: Skin is warm.   Neurological:      Mental Status: She is oriented to person, place, and time.      Comments: Answering questions and following simple commands            Vents:  Oxygen Concentration (%): 60 (04/04/24 2316)  Lines/Drains/Airways       Drain  Duration             Female External Urinary Catheter w/ Suction 04/04/24 2151 <1 day              Peripheral Intravenous Line  Duration                  Peripheral IV - Single Lumen 04/04/24 2122 18 G Right Antecubital <1 day         Peripheral IV - Single Lumen 04/04/24 2240 20 G Right Forearm <1 day         Peripheral IV - Single Lumen 04/05/24 0138 20 G Left Forearm <1 day                  Significant Labs:    CBC/Anemia Profile:  Recent Labs   Lab 04/04/24  2145   WBC 6.37   HGB 9.1*   HCT 28.8*   *   *   RDW 21.2*        Chemistries:  Recent Labs   Lab 04/04/24  2145      K 3.2*      CO2 18*   BUN 28*   CREATININE 1.2   CALCIUM 8.3*   ALBUMIN 3.6   PROT 6.4   BILITOT 4.4*   ALKPHOS 65   ALT 24   AST 35       All pertinent labs within the past 24 hours have been reviewed.    Significant Imaging: I have reviewed all pertinent imaging results/findings within the past 24 hours.

## 2024-04-05 NOTE — ASSESSMENT & PLAN NOTE
Recent Labs     04/05/24  0706   PH 7.194*   PCO2 55.8*   PO2 146*   HCO3 21.5*   POCSATURATED 99   BE -7*       -- Pulse oximetry continuous  -- Supplemental O2 target goal > 93%  -- See Septic Shock A/P

## 2024-04-06 LAB
OHS QRS DURATION: 86 MS
OHS QTC CALCULATION: 453 MS

## 2024-04-08 LAB — L PNEUMO AG UR QL IA: NEGATIVE

## 2024-04-09 LAB — POCT GLUCOSE: 123 MG/DL (ref 70–110)

## 2024-04-10 LAB
BACTERIA BLD CULT: NORMAL
BACTERIA BLD CULT: NORMAL
S PNEUM AG UR QL: NOT DETECTED

## (undated) DEVICE — FLUID DELIVERY SET WITH FILTER

## (undated) DEVICE — COVER BAND BAG 40 X 40

## (undated) DEVICE — GUIDEWIRE AMPLATZ STF .032X260

## (undated) DEVICE — SHEATH INTRODUCER 8FR 11CM

## (undated) DEVICE — DEVICE PERCLOSE SUT CLSR 6FR

## (undated) DEVICE — GUIDEWIRE TORAY INOUE

## (undated) DEVICE — SPIKE CONTRAST CONTROLLER

## (undated) DEVICE — TUBING HPCIL ROT M/F ADPT 10IN

## (undated) DEVICE — SEE MEDLINE ITEM 156894

## (undated) DEVICE — DILATOR VESSEL COONS 18FR 20CM

## (undated) DEVICE — GUIDEWIRE AMPLATZ .035X260

## (undated) DEVICE — STOPCOCK 3-WAY

## (undated) DEVICE — NDL TRANSEPTAL ADULT 71.0

## (undated) DEVICE — PROTECTION STATION PLUS

## (undated) DEVICE — SET BASIN 48X48IN 6000ML RING

## (undated) DEVICE — OMNIPAQUE 350 200ML

## (undated) DEVICE — SEE MEDLINE ITEM 107746

## (undated) DEVICE — SHEATH 8FR MULLINS TRANS

## (undated) DEVICE — KIT MICROINTRO 4F .018X40X7CM

## (undated) DEVICE — CATH SUPER TORQUE MP 4FRX80CM